# Patient Record
Sex: MALE | Race: WHITE | Employment: OTHER | URBAN - METROPOLITAN AREA
[De-identification: names, ages, dates, MRNs, and addresses within clinical notes are randomized per-mention and may not be internally consistent; named-entity substitution may affect disease eponyms.]

---

## 2018-07-16 ENCOUNTER — HOSPITAL ENCOUNTER (OUTPATIENT)
Age: 68
Discharge: HOME | End: 2018-07-18
Payer: COMMERCIAL

## 2018-07-16 LAB
ALBUMIN SERPL-MCNC: 4.5 G/DL (ref 3.5–5.2)
ALP BLD-CCNC: 44 U/L (ref 40–129)
ALT SERPL-CCNC: 14 U/L (ref 0–40)
ANION GAP SERPL CALCULATED.3IONS-SCNC: 15 MMOL/L (ref 7–16)
AST SERPL-CCNC: 19 U/L (ref 0–39)
BASOPHILS ABSOLUTE: 0.07 E9/L (ref 0–0.2)
BASOPHILS RELATIVE PERCENT: 1.2 % (ref 0–2)
BILIRUB SERPL-MCNC: 0.5 MG/DL (ref 0–1.2)
BUN BLDV-MCNC: 38 MG/DL (ref 8–23)
CALCIUM SERPL-MCNC: 9.9 MG/DL (ref 8.6–10.2)
CHLORIDE BLD-SCNC: 100 MMOL/L (ref 98–107)
CHOLESTEROL, TOTAL: 215 MG/DL (ref 0–199)
CO2: 25 MMOL/L (ref 22–29)
CREAT SERPL-MCNC: 2.5 MG/DL (ref 0.7–1.2)
CREATININE URINE: 209 MG/DL (ref 40–278)
EOSINOPHILS ABSOLUTE: 0.19 E9/L (ref 0.05–0.5)
EOSINOPHILS RELATIVE PERCENT: 3.2 % (ref 0–6)
GFR AFRICAN AMERICAN: 31
GFR NON-AFRICAN AMERICAN: 26 ML/MIN/1.73
GLUCOSE BLD-MCNC: 133 MG/DL (ref 74–109)
HBA1C MFR BLD: 8.2 % (ref 4–5.6)
HCT VFR BLD CALC: 47.1 % (ref 37–54)
HDLC SERPL-MCNC: 32 MG/DL
HEMOGLOBIN: 14.9 G/DL (ref 12.5–16.5)
IMMATURE GRANULOCYTES #: 0.05 E9/L
IMMATURE GRANULOCYTES %: 0.8 % (ref 0–5)
LDL CHOLESTEROL CALCULATED: 115 MG/DL (ref 0–99)
LYMPHOCYTES ABSOLUTE: 1.29 E9/L (ref 1.5–4)
LYMPHOCYTES RELATIVE PERCENT: 21.5 % (ref 20–42)
MCH RBC QN AUTO: 29.3 PG (ref 26–35)
MCHC RBC AUTO-ENTMCNC: 31.6 % (ref 32–34.5)
MCV RBC AUTO: 92.7 FL (ref 80–99.9)
MICROALBUMIN UR-MCNC: 836.7 MG/L
MICROALBUMIN/CREAT UR-RTO: 400.3 (ref 0–30)
MONOCYTES ABSOLUTE: 0.38 E9/L (ref 0.1–0.95)
MONOCYTES RELATIVE PERCENT: 6.3 % (ref 2–12)
NEUTROPHILS ABSOLUTE: 4.03 E9/L (ref 1.8–7.3)
NEUTROPHILS RELATIVE PERCENT: 67 % (ref 43–80)
PARATHYROID HORMONE INTACT: 50 PG/ML (ref 15–65)
PDW BLD-RTO: 14.4 FL (ref 11.5–15)
PHOSPHORUS: 3 MG/DL (ref 2.5–4.5)
PLATELET # BLD: 259 E9/L (ref 130–450)
PMV BLD AUTO: 10.9 FL (ref 7–12)
POTASSIUM SERPL-SCNC: 5.2 MMOL/L (ref 3.5–5)
RBC # BLD: 5.08 E12/L (ref 3.8–5.8)
SODIUM BLD-SCNC: 140 MMOL/L (ref 132–146)
TOTAL PROTEIN: 7.3 G/DL (ref 6.4–8.3)
TRIGL SERPL-MCNC: 341 MG/DL (ref 0–149)
VITAMIN D 25-HYDROXY: 26 NG/ML (ref 30–100)
VLDLC SERPL CALC-MCNC: 68 MG/DL
WBC # BLD: 6 E9/L (ref 4.5–11.5)

## 2018-07-16 PROCEDURE — 85025 COMPLETE CBC W/AUTO DIFF WBC: CPT

## 2018-07-16 PROCEDURE — 84100 ASSAY OF PHOSPHORUS: CPT

## 2018-07-16 PROCEDURE — 83970 ASSAY OF PARATHORMONE: CPT

## 2018-07-16 PROCEDURE — 83036 HEMOGLOBIN GLYCOSYLATED A1C: CPT

## 2018-07-16 PROCEDURE — 82044 UR ALBUMIN SEMIQUANTITATIVE: CPT

## 2018-07-16 PROCEDURE — 80061 LIPID PANEL: CPT

## 2018-07-16 PROCEDURE — 80053 COMPREHEN METABOLIC PANEL: CPT

## 2018-07-16 PROCEDURE — 82306 VITAMIN D 25 HYDROXY: CPT

## 2018-07-16 PROCEDURE — 84156 ASSAY OF PROTEIN URINE: CPT

## 2018-07-16 PROCEDURE — 82570 ASSAY OF URINE CREATININE: CPT

## 2018-07-17 LAB — PROTEIN PROTEIN: 119 MG/DL (ref 0–12)

## 2019-03-18 ENCOUNTER — HOSPITAL ENCOUNTER (OUTPATIENT)
Age: 69
Discharge: HOME | End: 2019-03-20
Payer: COMMERCIAL

## 2019-03-18 LAB
ALBUMIN SERPL-MCNC: 4.6 G/DL (ref 3.5–5.2)
ALP BLD-CCNC: 51 U/L (ref 40–129)
ALT SERPL-CCNC: 13 U/L (ref 0–40)
ANION GAP SERPL CALCULATED.3IONS-SCNC: 16 MMOL/L (ref 7–16)
AST SERPL-CCNC: 19 U/L (ref 0–39)
BACTERIA: ABNORMAL /HPF
BILIRUB SERPL-MCNC: 0.2 MG/DL (ref 0–1.2)
BILIRUBIN URINE: NEGATIVE
BLOOD, URINE: NEGATIVE
BUN BLDV-MCNC: 40 MG/DL (ref 8–23)
CALCIUM SERPL-MCNC: 9.7 MG/DL (ref 8.6–10.2)
CHLORIDE BLD-SCNC: 103 MMOL/L (ref 98–107)
CLARITY: CLEAR
CO2: 23 MMOL/L (ref 22–29)
COLOR: YELLOW
CREAT SERPL-MCNC: 2.6 MG/DL (ref 0.7–1.2)
CREATININE URINE: 205 MG/DL (ref 40–278)
CRYSTALS, UA: ABNORMAL
GFR AFRICAN AMERICAN: 30
GFR NON-AFRICAN AMERICAN: 25 ML/MIN/1.73
GLUCOSE BLD-MCNC: 157 MG/DL (ref 74–99)
GLUCOSE URINE: >=1000 MG/DL
KETONES, URINE: NEGATIVE MG/DL
LEUKOCYTE ESTERASE, URINE: NEGATIVE
NITRITE, URINE: NEGATIVE
PARATHYROID HORMONE INTACT: 42 PG/ML (ref 15–65)
PH UA: 5 (ref 5–9)
PHOSPHORUS: 3.4 MG/DL (ref 2.5–4.5)
POTASSIUM SERPL-SCNC: 4.5 MMOL/L (ref 3.5–5)
PROTEIN PROTEIN: 67 MG/DL (ref 0–12)
PROTEIN UA: 30 MG/DL
PROTEIN/CREAT RATIO: 0.3
PROTEIN/CREAT RATIO: 0.3 (ref 0–0.2)
RBC UA: ABNORMAL /HPF (ref 0–2)
SODIUM BLD-SCNC: 142 MMOL/L (ref 132–146)
SPECIFIC GRAVITY UA: 1.02 (ref 1–1.03)
TOTAL PROTEIN: 7.4 G/DL (ref 6.4–8.3)
UROBILINOGEN, URINE: 0.2 E.U./DL
WBC UA: ABNORMAL /HPF (ref 0–5)

## 2019-03-18 PROCEDURE — 83970 ASSAY OF PARATHORMONE: CPT

## 2019-03-18 PROCEDURE — 81001 URINALYSIS AUTO W/SCOPE: CPT

## 2019-03-18 PROCEDURE — 80053 COMPREHEN METABOLIC PANEL: CPT

## 2019-03-18 PROCEDURE — 84100 ASSAY OF PHOSPHORUS: CPT

## 2019-03-18 PROCEDURE — 82570 ASSAY OF URINE CREATININE: CPT

## 2019-03-18 PROCEDURE — 84156 ASSAY OF PROTEIN URINE: CPT

## 2019-07-07 ENCOUNTER — APPOINTMENT (OUTPATIENT)
Dept: CT IMAGING | Age: 69
DRG: 042 | End: 2019-07-07
Payer: COMMERCIAL

## 2019-07-07 ENCOUNTER — HOSPITAL ENCOUNTER (INPATIENT)
Age: 69
LOS: 4 days | Discharge: HOME | DRG: 042 | End: 2019-07-11
Attending: EMERGENCY MEDICINE | Admitting: INTERNAL MEDICINE
Payer: COMMERCIAL

## 2019-07-07 ENCOUNTER — APPOINTMENT (OUTPATIENT)
Dept: GENERAL RADIOLOGY | Age: 69
DRG: 042 | End: 2019-07-07
Payer: COMMERCIAL

## 2019-07-07 DIAGNOSIS — I63.9 CEREBROVASCULAR ACCIDENT (CVA), UNSPECIFIED MECHANISM (HCC): Primary | ICD-10-CM

## 2019-07-07 LAB
ALBUMIN SERPL-MCNC: 4.5 G/DL (ref 3.5–5.2)
ALP BLD-CCNC: 54 U/L (ref 40–129)
ALT SERPL-CCNC: 11 U/L (ref 0–40)
ANION GAP SERPL CALCULATED.3IONS-SCNC: 14 MMOL/L (ref 7–16)
APTT: 27.2 SEC (ref 24.5–35.1)
APTT: 27.7 SEC (ref 24.5–35.1)
AST SERPL-CCNC: 12 U/L (ref 0–39)
BASOPHILS ABSOLUTE: 0.07 E9/L (ref 0–0.2)
BASOPHILS RELATIVE PERCENT: 1 % (ref 0–2)
BILIRUB SERPL-MCNC: 0.4 MG/DL (ref 0–1.2)
BUN BLDV-MCNC: 39 MG/DL (ref 8–23)
CALCIUM SERPL-MCNC: 9.8 MG/DL (ref 8.6–10.2)
CHLORIDE BLD-SCNC: 100 MMOL/L (ref 98–107)
CO2: 26 MMOL/L (ref 22–29)
CREAT SERPL-MCNC: 2.5 MG/DL (ref 0.7–1.2)
EOSINOPHILS ABSOLUTE: 0.12 E9/L (ref 0.05–0.5)
EOSINOPHILS RELATIVE PERCENT: 1.8 % (ref 0–6)
GFR AFRICAN AMERICAN: 31
GFR NON-AFRICAN AMERICAN: 26 ML/MIN/1.73
GLUCOSE BLD-MCNC: 327 MG/DL (ref 74–99)
HBA1C MFR BLD: 8.5 % (ref 4–5.6)
HCT VFR BLD CALC: 46.2 % (ref 37–54)
HEMOGLOBIN: 15.1 G/DL (ref 12.5–16.5)
IMMATURE GRANULOCYTES #: 0.09 E9/L
IMMATURE GRANULOCYTES %: 1.3 % (ref 0–5)
INR BLD: 0.9
INR BLD: 0.9
LYMPHOCYTES ABSOLUTE: 1.11 E9/L (ref 1.5–4)
LYMPHOCYTES RELATIVE PERCENT: 16.2 % (ref 20–42)
MCH RBC QN AUTO: 29.4 PG (ref 26–35)
MCHC RBC AUTO-ENTMCNC: 32.7 % (ref 32–34.5)
MCV RBC AUTO: 90.1 FL (ref 80–99.9)
METER GLUCOSE: 164 MG/DL (ref 74–99)
METER GLUCOSE: 185 MG/DL (ref 74–99)
MONOCYTES ABSOLUTE: 0.39 E9/L (ref 0.1–0.95)
MONOCYTES RELATIVE PERCENT: 5.7 % (ref 2–12)
NEUTROPHILS ABSOLUTE: 5.07 E9/L (ref 1.8–7.3)
NEUTROPHILS RELATIVE PERCENT: 74 % (ref 43–80)
PDW BLD-RTO: 14.3 FL (ref 11.5–15)
PLATELET # BLD: 231 E9/L (ref 130–450)
PMV BLD AUTO: 10.6 FL (ref 7–12)
POTASSIUM SERPL-SCNC: 4.1 MMOL/L (ref 3.5–5)
PROTHROMBIN TIME: 10.3 SEC (ref 9.3–12.4)
PROTHROMBIN TIME: 10.4 SEC (ref 9.3–12.4)
RBC # BLD: 5.13 E12/L (ref 3.8–5.8)
SODIUM BLD-SCNC: 140 MMOL/L (ref 132–146)
TOTAL PROTEIN: 7.7 G/DL (ref 6.4–8.3)
TROPONIN: <0.01 NG/ML (ref 0–0.03)
WBC # BLD: 6.9 E9/L (ref 4.5–11.5)

## 2019-07-07 PROCEDURE — 3E05317 INTRODUCTION OF OTHER THROMBOLYTIC INTO PERIPHERAL ARTERY, PERCUTANEOUS APPROACH: ICD-10-PCS | Performed by: EMERGENCY MEDICINE

## 2019-07-07 PROCEDURE — 71045 X-RAY EXAM CHEST 1 VIEW: CPT

## 2019-07-07 PROCEDURE — 94761 N-INVAS EAR/PLS OXIMETRY MLT: CPT

## 2019-07-07 PROCEDURE — 83036 HEMOGLOBIN GLYCOSYLATED A1C: CPT

## 2019-07-07 PROCEDURE — 85025 COMPLETE CBC W/AUTO DIFF WBC: CPT

## 2019-07-07 PROCEDURE — 6370000000 HC RX 637 (ALT 250 FOR IP): Performed by: INTERNAL MEDICINE

## 2019-07-07 PROCEDURE — 36415 COLL VENOUS BLD VENIPUNCTURE: CPT

## 2019-07-07 PROCEDURE — 0042T CT BRAIN PERFUSION: CPT

## 2019-07-07 PROCEDURE — 85730 THROMBOPLASTIN TIME PARTIAL: CPT

## 2019-07-07 PROCEDURE — 99221 1ST HOSP IP/OBS SF/LOW 40: CPT | Performed by: PSYCHIATRY & NEUROLOGY

## 2019-07-07 PROCEDURE — 2500000003 HC RX 250 WO HCPCS: Performed by: EMERGENCY MEDICINE

## 2019-07-07 PROCEDURE — 70450 CT HEAD/BRAIN W/O DYE: CPT

## 2019-07-07 PROCEDURE — 2580000003 HC RX 258: Performed by: INTERNAL MEDICINE

## 2019-07-07 PROCEDURE — 2580000003 HC RX 258: Performed by: EMERGENCY MEDICINE

## 2019-07-07 PROCEDURE — 99285 EMERGENCY DEPT VISIT HI MDM: CPT

## 2019-07-07 PROCEDURE — 70498 CT ANGIOGRAPHY NECK: CPT

## 2019-07-07 PROCEDURE — 6360000004 HC RX CONTRAST MEDICATION: Performed by: RADIOLOGY

## 2019-07-07 PROCEDURE — 87081 CULTURE SCREEN ONLY: CPT

## 2019-07-07 PROCEDURE — 6360000002 HC RX W HCPCS: Performed by: EMERGENCY MEDICINE

## 2019-07-07 PROCEDURE — 80053 COMPREHEN METABOLIC PANEL: CPT

## 2019-07-07 PROCEDURE — 2000000000 HC ICU R&B

## 2019-07-07 PROCEDURE — 84484 ASSAY OF TROPONIN QUANT: CPT

## 2019-07-07 PROCEDURE — 82962 GLUCOSE BLOOD TEST: CPT

## 2019-07-07 PROCEDURE — 93005 ELECTROCARDIOGRAM TRACING: CPT | Performed by: EMERGENCY MEDICINE

## 2019-07-07 PROCEDURE — 96365 THER/PROPH/DIAG IV INF INIT: CPT

## 2019-07-07 PROCEDURE — 6360000002 HC RX W HCPCS: Performed by: PSYCHIATRY & NEUROLOGY

## 2019-07-07 PROCEDURE — 85610 PROTHROMBIN TIME: CPT

## 2019-07-07 PROCEDURE — 70496 CT ANGIOGRAPHY HEAD: CPT

## 2019-07-07 RX ORDER — SODIUM CHLORIDE 0.9 % (FLUSH) 0.9 %
10 SYRINGE (ML) INJECTION PRN
Status: DISCONTINUED | OUTPATIENT
Start: 2019-07-07 | End: 2019-07-11 | Stop reason: HOSPADM

## 2019-07-07 RX ORDER — LABETALOL HYDROCHLORIDE 5 MG/ML
10 INJECTION, SOLUTION INTRAVENOUS EVERY 10 MIN PRN
Status: DISCONTINUED | OUTPATIENT
Start: 2019-07-07 | End: 2019-07-11 | Stop reason: HOSPADM

## 2019-07-07 RX ORDER — SODIUM CHLORIDE 0.9 % (FLUSH) 0.9 %
10 SYRINGE (ML) INJECTION EVERY 12 HOURS SCHEDULED
Status: DISCONTINUED | OUTPATIENT
Start: 2019-07-07 | End: 2019-07-11 | Stop reason: HOSPADM

## 2019-07-07 RX ORDER — HYDRALAZINE HYDROCHLORIDE 20 MG/ML
10 INJECTION INTRAMUSCULAR; INTRAVENOUS EVERY 4 HOURS PRN
Status: DISCONTINUED | OUTPATIENT
Start: 2019-07-07 | End: 2019-07-07

## 2019-07-07 RX ORDER — DILTIAZEM HYDROCHLORIDE 240 MG/1
240 CAPSULE, COATED, EXTENDED RELEASE ORAL EVERY MORNING
Status: DISCONTINUED | OUTPATIENT
Start: 2019-07-08 | End: 2019-07-11 | Stop reason: HOSPADM

## 2019-07-07 RX ORDER — DEXTROSE MONOHYDRATE 50 MG/ML
100 INJECTION, SOLUTION INTRAVENOUS PRN
Status: DISCONTINUED | OUTPATIENT
Start: 2019-07-07 | End: 2019-07-08

## 2019-07-07 RX ORDER — NICOTINE POLACRILEX 4 MG
15 LOZENGE BUCCAL PRN
Status: DISCONTINUED | OUTPATIENT
Start: 2019-07-07 | End: 2019-07-11 | Stop reason: HOSPADM

## 2019-07-07 RX ORDER — 0.9 % SODIUM CHLORIDE 0.9 %
50 INTRAVENOUS SOLUTION INTRAVENOUS ONCE
Status: COMPLETED | OUTPATIENT
Start: 2019-07-07 | End: 2019-07-07

## 2019-07-07 RX ORDER — ATENOLOL 50 MG/1
50 TABLET ORAL DAILY
Status: DISCONTINUED | OUTPATIENT
Start: 2019-07-07 | End: 2019-07-11 | Stop reason: HOSPADM

## 2019-07-07 RX ORDER — DEXTROSE MONOHYDRATE 25 G/50ML
12.5 INJECTION, SOLUTION INTRAVENOUS
Status: ACTIVE | OUTPATIENT
Start: 2019-07-07 | End: 2019-07-07

## 2019-07-07 RX ORDER — ALLOPURINOL 300 MG/1
150 TABLET ORAL DAILY
Status: DISCONTINUED | OUTPATIENT
Start: 2019-07-08 | End: 2019-07-11 | Stop reason: HOSPADM

## 2019-07-07 RX ORDER — HYDRALAZINE HYDROCHLORIDE 20 MG/ML
10 INJECTION INTRAMUSCULAR; INTRAVENOUS EVERY 10 MIN PRN
Status: DISCONTINUED | OUTPATIENT
Start: 2019-07-07 | End: 2019-07-11 | Stop reason: HOSPADM

## 2019-07-07 RX ORDER — ATORVASTATIN CALCIUM 40 MG/1
80 TABLET, FILM COATED ORAL DAILY
Status: DISCONTINUED | OUTPATIENT
Start: 2019-07-08 | End: 2019-07-11 | Stop reason: HOSPADM

## 2019-07-07 RX ORDER — 0.9 % SODIUM CHLORIDE 0.9 %
1000 INTRAVENOUS SOLUTION INTRAVENOUS ONCE
Status: COMPLETED | OUTPATIENT
Start: 2019-07-07 | End: 2019-07-07

## 2019-07-07 RX ORDER — LABETALOL HYDROCHLORIDE 5 MG/ML
20 INJECTION, SOLUTION INTRAVENOUS ONCE
Status: COMPLETED | OUTPATIENT
Start: 2019-07-07 | End: 2019-07-07

## 2019-07-07 RX ORDER — LABETALOL HYDROCHLORIDE 5 MG/ML
10 INJECTION, SOLUTION INTRAVENOUS EVERY 4 HOURS PRN
Status: DISCONTINUED | OUTPATIENT
Start: 2019-07-07 | End: 2019-07-07

## 2019-07-07 RX ORDER — FERROUS SULFATE 325(65) MG
325 TABLET ORAL DAILY
Status: DISCONTINUED | OUTPATIENT
Start: 2019-07-08 | End: 2019-07-11 | Stop reason: HOSPADM

## 2019-07-07 RX ORDER — DEXTROSE MONOHYDRATE 25 G/50ML
12.5 INJECTION, SOLUTION INTRAVENOUS PRN
Status: DISCONTINUED | OUTPATIENT
Start: 2019-07-07 | End: 2019-07-11 | Stop reason: HOSPADM

## 2019-07-07 RX ORDER — ONDANSETRON 2 MG/ML
4 INJECTION INTRAMUSCULAR; INTRAVENOUS EVERY 6 HOURS PRN
Status: DISCONTINUED | OUTPATIENT
Start: 2019-07-07 | End: 2019-07-11 | Stop reason: HOSPADM

## 2019-07-07 RX ORDER — FENOFIBRATE 160 MG/1
160 TABLET ORAL DAILY
Status: DISCONTINUED | OUTPATIENT
Start: 2019-07-07 | End: 2019-07-11 | Stop reason: HOSPADM

## 2019-07-07 RX ADMIN — IOPAMIDOL 100 ML: 755 INJECTION, SOLUTION INTRAVENOUS at 15:36

## 2019-07-07 RX ADMIN — ATENOLOL 50 MG: 50 TABLET ORAL at 17:54

## 2019-07-07 RX ADMIN — INSULIN LISPRO 1 UNITS: 100 INJECTION, SOLUTION INTRAVENOUS; SUBCUTANEOUS at 17:54

## 2019-07-07 RX ADMIN — ALTEPLASE 9 MG: KIT at 14:01

## 2019-07-07 RX ADMIN — SODIUM CHLORIDE 50 ML: 9 INJECTION, SOLUTION INTRAVENOUS at 15:01

## 2019-07-07 RX ADMIN — Medication 10 ML: at 21:13

## 2019-07-07 RX ADMIN — Medication 10 ML: at 20:41

## 2019-07-07 RX ADMIN — SODIUM CHLORIDE 1000 ML: 9 INJECTION, SOLUTION INTRAVENOUS at 15:39

## 2019-07-07 RX ADMIN — HYDRALAZINE HYDROCHLORIDE 10 MG: 20 INJECTION INTRAMUSCULAR; INTRAVENOUS at 18:13

## 2019-07-07 RX ADMIN — INSULIN LISPRO 1 UNITS: 100 INJECTION, SOLUTION INTRAVENOUS; SUBCUTANEOUS at 20:44

## 2019-07-07 RX ADMIN — ALTEPLASE 81 MG: KIT at 14:01

## 2019-07-07 RX ADMIN — LABETALOL HYDROCHLORIDE 20 MG: 5 INJECTION INTRAVENOUS at 15:32

## 2019-07-07 RX ADMIN — Medication 10 ML: at 18:14

## 2019-07-07 ASSESSMENT — PAIN SCALES - GENERAL
PAINLEVEL_OUTOF10: 0
PAINLEVEL_OUTOF10: 0

## 2019-07-07 NOTE — CONSULTS
Motor Drift 0 - no drift; leg holds 30 degree position for full 5 seconds   6B: Test Right Leg Motor Drift 0 - no drift; leg holds 30 degree position for full 5 seconds   7: Test Limb Ataxia   (FNF/Heel-Shin) 2 - present in two limbs   8: Test Sensation 1 - mild to moderate sensory loss; patient feels pinprick is less sharp or is dull on the affected side; there is a loss of superficial pain with pinprick but patient is aware of being touched    9: Test Language/Aphasia 0 - no aphasia, normal   10: Test Dysarthria 0 - normal   11: Test Extinction/Inattention 0 - no abnormality   Total 4         Laboratory/Radiology:     CBC with Differential:    Lab Results   Component Value Date    WBC 6.9 07/07/2019    RBC 5.13 07/07/2019    HGB 15.1 07/07/2019    HCT 46.2 07/07/2019     07/07/2019    MCV 90.1 07/07/2019    MCH 29.4 07/07/2019    MCHC 32.7 07/07/2019    RDW 14.3 07/07/2019    SEGSPCT 70 02/25/2014    LYMPHOPCT 16.2 07/07/2019    MONOPCT 5.7 07/07/2019    BASOPCT 1.0 07/07/2019    MONOSABS 0.39 07/07/2019    LYMPHSABS 1.11 07/07/2019    EOSABS 0.12 07/07/2019    BASOSABS 0.07 07/07/2019     CMP:    Lab Results   Component Value Date     07/07/2019    K 4.1 07/07/2019     07/07/2019    CO2 26 07/07/2019    BUN 39 07/07/2019    CREATININE 2.5 07/07/2019    GFRAA 31 07/07/2019    LABGLOM 26 07/07/2019    GLUCOSE 327 07/07/2019    GLUCOSE 102 05/02/2012    PROT 7.7 07/07/2019    LABALBU 4.5 07/07/2019    LABALBU 4.5 05/02/2012    CALCIUM 9.8 07/07/2019    BILITOT 0.4 07/07/2019    ALKPHOS 54 07/07/2019    AST 12 07/07/2019    ALT 11 07/07/2019     Troponin:    Lab Results   Component Value Date    TROPONINI <0.01 07/07/2019     HgBA1c:    Lab Results   Component Value Date    LABA1C 8.2 07/16/2018     FLP:    Lab Results   Component Value Date    TRIG 341 07/16/2018    HDL 32 07/16/2018    LDLCALC 115 07/16/2018    LABVLDL 68 07/16/2018     Neuroimaging as above.    I independently reviewed the labs and imaging studies at today's appointment. Assessment:     78year old man with multiple vascular risk factors presents with embolic appearing stroke to distal posterior branch of LMCA. No significant atherosclerosis in carotids or intracranially. Suspect cardioembolic vs aortic atheroma. Patient Active Problem List   Diagnosis    S/P AAA (abdominal aortic aneurysm) repair    Hypertension    Hyperlipidemia    Diabetes mellitus ([facility] [address]    Renal insufficiency    Stroke determined by clinical assessment ([facility] [address]       Plan:     Post TPA protocol. Blood pressure less than 185/105  MRI tomorrow. Echocardiogram.   Continue high intensity statin. AM a1c and lipids. Discussed smoking cessation. He plans to quit. He is unsure if he wants nicotine patch. Gently hydration given contrast load and CKD.       Renita Murphy  5:57 PM  7/7/2019

## 2019-07-07 NOTE — CONSULTS
mellitus ([facility] [address]     Hyperlipidemia     Hypertension     Renal insufficiency     dr Petr Kendrick     Past Surgical History:   Procedure Laterality Date    ABDOMINAL AORTIC ANEURYSM REPAIR  3-    BUNIONECTOMY  2012   [facility] JOINT REPLACEMENT  2013    right knee    TONSILLECTOMY       Social History     Socioeconomic History    Marital status:      Spouse name: Not on file    Number of children: Not on file    Years of education: Not on file    Highest education level: Not on file   Occupational History    Not on file   Social Needs    Financial resource strain: Not on file    Food insecurity:     Worry: Not on file     Inability: Not on file    Transportation needs:     Medical: Not on file     Non-medical: Not on file   Tobacco Use    Smoking status: Current Every Day Smoker     Packs/day: 0.25     Years: 40.00     Pack years: 10.00    Smokeless tobacco: Never Used   Substance and Sexual Activity    Alcohol use:  Yes     Alcohol/week: 0.6 oz     Types: 1 Cans of beer per week     Comment: weekly- 4    Drug use: No    Sexual activity: Not on file   Lifestyle    Physical activity:     Days per week: Not on file     Minutes per session: Not on file    Stress: Not on file   Relationships    Social connections:     Talks on phone: Not on file     Gets together: Not on file     Attends Jewish service: Not on file     Active member of club or organization: Not on file     Attends meetings of clubs or organizations: Not on file     Relationship status: Not on file    Intimate partner violence:     Fear of current or ex partner: Not on file     Emotionally abused: Not on file     Physically abused: Not on file     Forced sexual activity: Not on file   Other Topics Concern    Not on file   Social History Narrative    Not on file       OBJECTIVE  Vitals:    07/07/19 1341 07/07/19 1342 07/07/19 1343 07/07/19 1343   BP:    (!) 155/111   Pulse: 106  101 103   Resp: 12  20 20   SpO2: 98%  98% 100%

## 2019-07-07 NOTE — CONSULTS
treatment    Consultation completed by Rashaad Kraus MD via telephone.       Electronically signed by Rashaad Kraus MD on 7/7/2019 at 2:25 PM

## 2019-07-08 ENCOUNTER — APPOINTMENT (OUTPATIENT)
Dept: MRI IMAGING | Age: 69
DRG: 042 | End: 2019-07-08
Payer: COMMERCIAL

## 2019-07-08 LAB
ANION GAP SERPL CALCULATED.3IONS-SCNC: 13 MMOL/L (ref 7–16)
BASOPHILS ABSOLUTE: 0.07 E9/L (ref 0–0.2)
BASOPHILS RELATIVE PERCENT: 1 % (ref 0–2)
BUN BLDV-MCNC: 34 MG/DL (ref 8–23)
CALCIUM SERPL-MCNC: 9 MG/DL (ref 8.6–10.2)
CHLORIDE BLD-SCNC: 107 MMOL/L (ref 98–107)
CHOLESTEROL, TOTAL: 233 MG/DL (ref 0–199)
CO2: 22 MMOL/L (ref 22–29)
CREAT SERPL-MCNC: 2.1 MG/DL (ref 0.7–1.2)
EKG ATRIAL RATE: 92 BPM
EKG P AXIS: 68 DEGREES
EKG P-R INTERVAL: 212 MS
EKG Q-T INTERVAL: 390 MS
EKG QRS DURATION: 154 MS
EKG QTC CALCULATION (BAZETT): 482 MS
EKG R AXIS: -94 DEGREES
EKG T AXIS: 50 DEGREES
EKG VENTRICULAR RATE: 92 BPM
EOSINOPHILS ABSOLUTE: 0.23 E9/L (ref 0.05–0.5)
EOSINOPHILS RELATIVE PERCENT: 3.2 % (ref 0–6)
GFR AFRICAN AMERICAN: 38
GFR NON-AFRICAN AMERICAN: 31 ML/MIN/1.73
GLUCOSE BLD-MCNC: 101 MG/DL (ref 74–99)
HCT VFR BLD CALC: 42 % (ref 37–54)
HDLC SERPL-MCNC: 27 MG/DL
HEMOGLOBIN: 13.8 G/DL (ref 12.5–16.5)
IMMATURE GRANULOCYTES #: 0.07 E9/L
IMMATURE GRANULOCYTES %: 1 % (ref 0–5)
LDL CHOLESTEROL CALCULATED: 130 MG/DL (ref 0–99)
LYMPHOCYTES ABSOLUTE: 1.34 E9/L (ref 1.5–4)
LYMPHOCYTES RELATIVE PERCENT: 18.5 % (ref 20–42)
MCH RBC QN AUTO: 29.7 PG (ref 26–35)
MCHC RBC AUTO-ENTMCNC: 32.9 % (ref 32–34.5)
MCV RBC AUTO: 90.3 FL (ref 80–99.9)
METER GLUCOSE: 135 MG/DL (ref 74–99)
METER GLUCOSE: 136 MG/DL (ref 74–99)
METER GLUCOSE: 137 MG/DL (ref 74–99)
METER GLUCOSE: 275 MG/DL (ref 74–99)
MONOCYTES ABSOLUTE: 0.5 E9/L (ref 0.1–0.95)
MONOCYTES RELATIVE PERCENT: 6.9 % (ref 2–12)
NEUTROPHILS ABSOLUTE: 5.05 E9/L (ref 1.8–7.3)
NEUTROPHILS RELATIVE PERCENT: 69.4 % (ref 43–80)
PDW BLD-RTO: 14.6 FL (ref 11.5–15)
PLATELET # BLD: 204 E9/L (ref 130–450)
PMV BLD AUTO: 10.2 FL (ref 7–12)
POTASSIUM REFLEX MAGNESIUM: 3.8 MMOL/L (ref 3.5–5)
RBC # BLD: 4.65 E12/L (ref 3.8–5.8)
SODIUM BLD-SCNC: 142 MMOL/L (ref 132–146)
TRIGL SERPL-MCNC: 382 MG/DL (ref 0–149)
VLDLC SERPL CALC-MCNC: 76 MG/DL
WBC # BLD: 7.3 E9/L (ref 4.5–11.5)

## 2019-07-08 PROCEDURE — 36415 COLL VENOUS BLD VENIPUNCTURE: CPT

## 2019-07-08 PROCEDURE — 2060000000 HC ICU INTERMEDIATE R&B

## 2019-07-08 PROCEDURE — 82962 GLUCOSE BLOOD TEST: CPT

## 2019-07-08 PROCEDURE — 97166 OT EVAL MOD COMPLEX 45 MIN: CPT

## 2019-07-08 PROCEDURE — 70551 MRI BRAIN STEM W/O DYE: CPT

## 2019-07-08 PROCEDURE — 97162 PT EVAL MOD COMPLEX 30 MIN: CPT

## 2019-07-08 PROCEDURE — 93010 ELECTROCARDIOGRAM REPORT: CPT | Performed by: INTERNAL MEDICINE

## 2019-07-08 PROCEDURE — 97530 THERAPEUTIC ACTIVITIES: CPT

## 2019-07-08 PROCEDURE — 99231 SBSQ HOSP IP/OBS SF/LOW 25: CPT | Performed by: NURSE PRACTITIONER

## 2019-07-08 PROCEDURE — 80048 BASIC METABOLIC PNL TOTAL CA: CPT

## 2019-07-08 PROCEDURE — 99233 SBSQ HOSP IP/OBS HIGH 50: CPT | Performed by: NURSE PRACTITIONER

## 2019-07-08 PROCEDURE — 80061 LIPID PANEL: CPT

## 2019-07-08 PROCEDURE — 6360000002 HC RX W HCPCS: Performed by: INTERNAL MEDICINE

## 2019-07-08 PROCEDURE — 2580000003 HC RX 258: Performed by: INTERNAL MEDICINE

## 2019-07-08 PROCEDURE — 85025 COMPLETE CBC W/AUTO DIFF WBC: CPT

## 2019-07-08 PROCEDURE — 6370000000 HC RX 637 (ALT 250 FOR IP): Performed by: INTERNAL MEDICINE

## 2019-07-08 PROCEDURE — 92523 SPEECH SOUND LANG COMPREHEN: CPT | Performed by: SPEECH-LANGUAGE PATHOLOGIST

## 2019-07-08 PROCEDURE — 97535 SELF CARE MNGMENT TRAINING: CPT

## 2019-07-08 RX ADMIN — DILTIAZEM HYDROCHLORIDE 240 MG: 240 CAPSULE, COATED, EXTENDED RELEASE ORAL at 09:20

## 2019-07-08 RX ADMIN — Medication 10 ML: at 09:22

## 2019-07-08 RX ADMIN — Medication 10 ML: at 21:34

## 2019-07-08 RX ADMIN — ALLOPURINOL 150 MG: 300 TABLET ORAL at 09:22

## 2019-07-08 RX ADMIN — INSULIN LISPRO 3 UNITS: 100 INJECTION, SOLUTION INTRAVENOUS; SUBCUTANEOUS at 12:10

## 2019-07-08 RX ADMIN — HYDRALAZINE HYDROCHLORIDE 10 MG: 20 INJECTION INTRAMUSCULAR; INTRAVENOUS at 22:42

## 2019-07-08 RX ADMIN — ATENOLOL 50 MG: 50 TABLET ORAL at 09:20

## 2019-07-08 RX ADMIN — FENOFIBRATE 160 MG: 160 TABLET ORAL at 09:21

## 2019-07-08 RX ADMIN — ATORVASTATIN CALCIUM 80 MG: 40 TABLET, FILM COATED ORAL at 09:20

## 2019-07-08 RX ADMIN — FERROUS SULFATE TAB 325 MG (65 MG ELEMENTAL FE) 325 MG: 325 (65 FE) TAB at 09:20

## 2019-07-08 ASSESSMENT — PAIN SCALES - GENERAL
PAINLEVEL_OUTOF10: 0

## 2019-07-08 NOTE — PROGRESS NOTES
standard toilet  Equipment owned: ww, cane, BSC, shower chair    Prior Level of Function: Indep with ADLs; Indep with IADLs including outdoor/lawn work; using no device for ambulation. Driving: yes  Occupation: Retired- enjoys playing "[employer] 3 days/week     Pain Level: 0/10   Cognition: A&O: 4/4; Follows 2 step directions. G Attention to task. Communication: G  Ability to express needs   Memory: G   Sequencing: F             Comprehension: G   Problem solving: F   Judgement/safety: F-   Max cues for RUE awareness during STS/transfers, ADLs and also when using stairs/rail use     RASS: 0  CAM-ICU: NT     Functional Assessment:   Initial Eval Status  Date: 7/8/19 Treatment Status  Date: Short Term Goals  Treatment frequency: 1-3x/week on ICU; PRN on stepdown unit  -pt will improve. .. Feeding S;setup  No issues using non-dominant L hand (simulation). P/P+ [address] and grasp/pinch strength of R hand for utensil use (max A). Intro to built-up foam.       Mod I  Sitting upright in chair for majority of meals using AE prn; pending cleared diet restriction   Grooming Min A  SBA for standing balance at sink no device     P+ use of R hand as gross stabilizer with VC/education while manipulating items with L hand. Unable to hold tooth brush R hand but able with L to brush teeth. Mod I   while seated/standing with AE prn; G RUE functional use as gross stabilizer and F [address] [place] skills     UB Dressing Min A  Changing gown with education on compensatory technique threading RUE first; seated arm chair.     Assist to tie, manipulate snaps d/t poor R hand FMC/pinch strength    Min A for standing balance during gown retrieval from lower drawer with F- R hand grasping ability when retrieving; good carryover of draping gown over forearm when transporting back to bedside     Mod I  while seated using compensatory technique; including clothing retrieval;    F RUE functional use and [address] [place] skills; G RUE awareness   LB Dressing Mod A  Assist to ROM  Comments   RUE  Proximal: 4/5 shoulder, elbow, forearm, wrist  Distal:   3-/5 digits flexion/extension  1/5 intrinsics and interossei   Thumb 2/5 Proximal: WFL   Distal: WFL P+   P+ FMC/dexterity noted during ADL tasks; gross stabilizer     Oppose thumb to digits 2-3 only. Awkward movements   LUE Proximal: 5/5  Distal: 5/5 Proximal:  -PROM: WFL   -AROM: WFL  Distal: WFL G   G FMC/dexterity noted during ADL tasks      Hearing: [facility]   Sensation: Pt reports numbness/ tingling in RUE from shoulder to digits. Decreased awareness noted. Tone: WFL  Edema: Unremarkable    Vitals:   BP at rest: 149/82 BP at end of session: 131/85   HR at rest: 84 bpm HR at end of session: 88 bpm   Spo2 at rest: 93% RA  Spo2 at end of session: 96% RA                             Comments/Treatment Narrative: Attended interdisciplinary rounds. OK from RN to see patient. Thorough chart review and evaluation completed with PT collaboration. Upon arrival patient supine with HOB slightly elevated-wife present. RN present and providing medications. Patient agreeable to session. Pt denies any changes in vision. Showing RUE distal weakness, decreased [address] [place] skills and decreased proprioceptive awareness of position in space during ADL tasks and transfers. Pt completed UB/LB dressing tasks and grooming tasks while standing at sink with continual VC for R hand awareness and use as gross stabilizer with P+ ability; transfers and functional mobility completed using no device as stated above. Completed clothing retrieval from lower drawers with P+ use of RUE and min A for dyn balance. No dizziness noted. Standing tolerance ~12 minutes. Returned to bedside arm chair with all devices within reach, all lines and tubes intact, nursing notified of pt status- recommend BSC use. Jodie Kendrick Pt required cues and education as noted above for safe facilitation and completion of tasks.  During functional activites and ADLs pt educated on RUE awareness and proper

## 2019-07-08 NOTE — PROGRESS NOTES
Gait:  Not tested     DTR:   1-2 throughout    No Babinskis  No Rizvi's    No pathological reflexes    Laboratory/Radiology:     CBC:   Lab Results   Component Value Date    WBC 7.3 07/08/2019    RBC 4.65 07/08/2019    HGB 13.8 07/08/2019    HCT 42.0 07/08/2019    MCV 90.3 07/08/2019    MCH 29.7 07/08/2019    MCHC 32.9 07/08/2019    RDW 14.6 07/08/2019     07/08/2019    MPV 10.2 07/08/2019     CMP:    Lab Results   Component Value Date     07/08/2019    K 3.8 07/08/2019     07/08/2019    CO2 22 07/08/2019    BUN 34 07/08/2019    CREATININE 2.1 07/08/2019    GFRAA 38 07/08/2019    LABGLOM 31 07/08/2019    GLUCOSE 101 07/08/2019    PROT 7.7 07/07/2019    LABALBU 4.5 07/07/2019    CALCIUM 9.0 07/08/2019    BILITOT 0.4 07/07/2019    ALKPHOS 54 07/07/2019    AST 12 07/07/2019    ALT 11 07/07/2019     Hepatic Function Panel:    Lab Results   Component Value Date    ALKPHOS 54 07/07/2019    ALT 11 07/07/2019    AST 12 07/07/2019    PROT 7.7 07/07/2019    BILITOT 0.4 07/07/2019    LABALBU 4.5 07/07/2019     HgBA1c:    Lab Results   Component Value Date    LABA1C 8.5 07/07/2019     FLP:    Lab Results   Component Value Date    TRIG 382 07/08/2019    HDL 27 07/08/2019    LDLCALC 130 07/08/2019    LABVLDL 76 07/08/2019     CT Head WO Contrast   Final Result      No evidence of acute intracranial hemorrhage or edema. CTA HEAD W CONTRAST   Final Result      1. No evidence of intracranial hemorrhage or edema. 2. No evidence of arterial stenosis at level of the [place]. 3. No evidence of stenosis involving proximal or distal cervical   internal carotid arteries or vertebral arteries. 4. Significant noncalcified atherosclerotic plaque is seen involving   thoracic aortic arch and at origin in of great vessels. CTA NECK W CONTRAST   Final Result      1. No evidence of intracranial hemorrhage or edema.       2. No evidence of arterial stenosis at level of the

## 2019-07-08 NOTE — PLAN OF CARE
Problem: Falls - Risk of:  Goal: Will remain free from falls  Description  Will remain free from falls  7/8/2019 0026 by Tracey Guerra RN  Outcome: Met This Shift  7/7/2019 1858 by Lucille Rangel RN  Outcome: Met This Shift  Goal: Absence of physical injury  Description  Absence of physical injury  7/8/2019 0026 by Tracey Guerra RN  Outcome: Met This Shift  7/7/2019 2114 by Tracey Guerra RN  Outcome: Met This Shift  7/7/2019 1858 by Lucille Rangel RN  Outcome: Met This Shift     Problem: HEMODYNAMIC STATUS  Goal: Patient has stable vital signs and fluid balance  7/8/2019 0026 by Tracey Guerra RN  Outcome: Met This Shift  7/7/2019 2114 by Tracey Guerra RN  Outcome: Met This Shift     Problem: ACTIVITY INTOLERANCE/IMPAIRED MOBILITY  Goal: Mobility/activity is maintained at optimum level for patient  7/8/2019 0026 by Tracey Guerra RN  Outcome: Met This Shift  7/7/2019 2114 by Tracey Guerra RN  Outcome: Met This Shift     Problem: COMMUNICATION IMPAIRMENT  Goal: Ability to express needs and understand communication  7/8/2019 0026 by Tracey Guerra RN  Outcome: Met This Shift  7/7/2019 2114 by Tracey Guerra RN  Outcome: Met This Shift

## 2019-07-08 NOTE — PROGRESS NOTES
[facility] Int[facility] Unit  Critical Care Consult 7/8/2019      Date of Admission: 7/7/19    CC: R sided weakness    HPI: 78year old male with a PMH of HTN, HLD, DM, AAA, CKD presented to the ED with c/o right sided weakness. He was given tPA and was admitted to NSICU for further care. Problem List:   Patient Active Problem List   Diagnosis    S/P AAA (abdominal aortic aneurysm) repair    Hypertension    Hyperlipidemia    Diabetes mellitus ([facility] [address]    Renal insufficiency    Stroke determined by clinical assessment [facility])       Surgical/Interventional Procedures:   7/7 tPA 80    PMH:    has a past medical history of AAA (abdominal aortic aneurysm) ([facility] [address] Diabetes mellitus ([facility] [address] Hyperlipidemia, Hypertension, and Renal insufficiency. PSH:    has a past surgical history that includes joint replacement (2013); Bunionectomy (2012); Tonsillectomy; and Abdominal aortic aneurysm repair (3-). Home Medications:   Prior to Admission medications    Medication Sig Start Date End Date Taking?  Authorizing Provider   glipiZIDE (GLUCOTROL) 10 MG tablet Take 1 tablet by mouth daily 3/24/16  Yes Amber Overton, DO   allopurinol (ZYLOPRIM) 300 MG tablet Take 0.5 tablets by mouth daily 3/24/16  Yes Amber Overton, DO   ferrous sulfate (NAVA-FEMI) 325 (65 FE) MG tablet Take 1 tablet by mouth daily 3/23/16  Yes Amber Overton,    diltiazem (CARDIZEM CD) 240 MG ER capsule Take 240 mg by mouth every morning    Yes Historical Provider, MD   atorvastatin (LIPITOR) 80 MG tablet Take 80 mg by mouth daily   Yes Historical Provider, MD   Cholecalciferol (VITAMIN D-3 PO) Take 50,000 Units by mouth daily    Yes Historical Provider, MD   Vitamins A & D (VITAMIN A & D PO) Take by mouth daily   Yes Historical Provider, MD   CALCIUM & MAGNESIUM CARBONATES PO Take by mouth three times a week   Yes Historical Provider, MD   Coenzyme Q10 (CO Q 10 PO) Take by mouth daily   Yes Historical Provider, MD   Omega-3 Fatty

## 2019-07-08 NOTE — H&P
Normocephalic, without obvious abnormality, atraumatic  Eyes: conjunctivae/corneas clear. PERRL, EOM's intact. Fundi benign. Ears: normal TM's and external ear canals both ears  Nose: Nares normal. Septum midline. Mucosa normal. No drainage or sinus tenderness. Throat: lips, mucosa, and tongue normal; teeth and gums normal  Neck: no adenopathy, no carotid bruit, no JVD, supple, symmetrical, trachea midline and thyroid not enlarged, symmetric, no tenderness/mass/nodules  Lungs: clear to auscultation bilaterally  Heart: regular rate and rhythm, S1, S2 normal, no murmur, click, rub or gallop  Abdomen: soft, non-tender; bowel sounds normal; no masses,  no organomegaly  Extremities: extremities normal, atraumatic, no cyanosis or edema  Pulses: 2+ and symmetric  Skin: Skin color, texture, turgor normal. No rashes or lesions  Neurologic: Speech is fluent and normal in content; gait not tested; 3/5 muscle strength right upper extremity with difficulty with fine motor coordination; no other extremity weakness noted    Last Refreshed: 19 1456 [Time Alexis Orders]   Results      Component Value Units   MRI Brain WO Contrast [[id number]] Resulted: 19 1415   Updated: 19 141    Narrative:     Patient MRN:  [id number]  : 1950  Age: 78 years  Gender: Male    Order Date:  2019 12:00 AM    EXAM: MRI BRAIN WO CONTRAST      INDICATION:  STROKE      COMPARISON: None    FINDINGS:    On diffusion-weighted imaging there is a tiny acute lacunar infarction  in the right occipital lobe. There is a small acute infarction in the  left frontoparietal convexity. There is normal flow voids of the intracranial circulation. There are  retention cysts in both maxillary sinuses more prominent on the left. There is mild chronic ischemic/degenerative changes in the white  matter. There is no mass effect, edema or hemorrhage. Impression:     Small acute infarcts.  Largest is in left frontoparietal convexity and  a smaller one is in the right occipital lobe. Multiplicity and  bilaterality raises the possibility of cardiac embolic source. Clinical correlation is recommended       POCT Glucose [[id number]] (Abnormal) Collected: 07/08/19 1208   Updated: 07/08/19 1214     Meter Glucose 275High  mg/dL   POCT Glucose [[id number]] (Abnormal) Collected: 07/08/19 0850   Updated: 07/08/19 0855     Meter Glucose 135High  mg/dL   EKG 12 Lead [[id number]] Collected: 07/07/19 1413   Updated: 07/08/19 0717     Ventricular Rate 92 BPM    Atrial Rate 92 BPM    P-R Interval 212 ms    QRS Duration 154 ms    Q-T Interval 390 ms    QTc Calculation (Bazett) 482 ms    P Axis 68 degrees    R Axis -94 degrees    T Axis 50 degrees   Narrative:     Sinus rhythm with 1st degree AV block  Possible Left atrial enlargement  Right bundle branch block    Confirmed by Theo Cortez ([id number]) on 7/8/2019 6:37:34 AM   Basic Metabolic Panel w/ Reflex to MG [[id number]] (Abnormal) Collected: 07/08/19 0500   Updated: 07/08/19 0552    Specimen Source: Blood     Sodium 142 mmol/L    Potassium reflex Magnesium 3.8 mmol/L    Chloride 107 mmol/L    CO2 22 mmol/L    Anion Gap 13 mmol/L    Glucose 101High  mg/dL    BUN 34High  mg/dL    CREATININE 2.1High  mg/dL    GFR Non-African American 31 mL/min/1.73    Comment: Chronic Kidney Disease: less than 60 ml/min/1.73 sq. m.         Kidney Failure: less than 15 ml/min/1.73 sq.m. Results valid for patients 18 years and older.         GFR African American 38    Calcium 9.0 mg/dL   Lipid panel [[id number]] (Abnormal) Collected: 07/08/19 0500   Updated: 07/08/19 0552    Specimen Source: Blood     Cholesterol, Total 233High  mg/dL    Triglycerides 382High  mg/dL    HDL 27 mg/dL    LDL Calculated 130High  mg/dL    VLDL Cholesterol Calculated 76 mg/dL   CBC auto differential [[id number]] (Abnormal) Collected: 07/08/19 0500   Updated: 07/08/19 0527    Specimen Source: Blood     WBC 7.3 E9/L    RBC 4.65 E12/L    Hemoglobin 13.8 g/dL    Hematocrit and left subclavian  arteries and thoracic aortic arch. Common carotid arteries are patent  without evidence of stenosis. Carotid bulbs are unremarkable. No  evidence of stenosis involving proximal or distal cervical internal  carotid arteries or vertebral arteries. 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 Impression:       1. No evidence of intracranial hemorrhage or edema. 2. No evidence of arterial stenosis at level of the [place]. 3. No evidence of stenosis involving proximal or distal cervical  internal carotid arteries or vertebral arteries. 4. Significant noncalcified atherosclerotic plaque is seen involving  thoracic aortic arch and at origin in of great vessels. CTA NECK Maurizio Alexandra [[id number]] Resulted: 19   Updated: 19    Narrative:     Patient MRN:  [id number]  : 1950  Age: 78 years  Gender: Male    Order Date:  2019 1:45 PM    EXAM: CTA HEAD W CONTRAST, CTA NECK W CONTRAST    COMPARISON: None    INDICATION:  stroke      TECHNIQUE: Unenhanced CT performed, then contiguous axial images  through the head and neck were obtained following intravenous contrast  using standard CT angiographic protocol. Sagittal and coronal MIPS  images were reconstructed from the axial acquisition. Additional 3-D  reconstructions were obtained to aid in interpretation of this  examination. Low-dose CT acquisition technique including one of the  following options: 1. Automated exposure control. 2. Adjustment of mA  and/or KV according to the patient's size. 3. Use of iterative  reconstruction. FINDINGS:    CT HEAD:    No acute intracranial hemorrhage or edema. No abnormal extra-axial  fluid collections. CTA HEAD:    Anterior cerebral arteries, middle cerebral arteries, and posterior  cerebral arteries are patent without evidence of stenosis. There is  normal vertebral basilar junction. Basilar artery is patent without  evidence of stenosis.  No evidence of intracranial aneurysm or AVM. CTA NECK:    Significant burden of noncalcified atherosclerotic plaque is seen at  the origin and in of brachiocephalic, left common, and left subclavian  arteries and thoracic aortic arch. Common carotid arteries are patent  without evidence of stenosis. Carotid bulbs are unremarkable. No  evidence of stenosis involving proximal or distal cervical internal  carotid arteries or vertebral arteries. 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 Impression:       1. No evidence of intracranial hemorrhage or edema. 2. No evidence of arterial stenosis at level of the [place]. 3. No evidence of stenosis involving proximal or distal cervical  internal carotid arteries or vertebral arteries. 4. Significant noncalcified atherosclerotic plaque is seen involving  thoracic aortic arch and at origin in of great vessels. Hemoglobin A1c [[id number]] (Abnormal) Collected: 19 173   Updated: 19 1805    Specimen Source: Blood     Hemoglobin A1C 8.5High  %   POCT Glucose [[id number]] (Abnormal) Collected: 19 173   Updated: 19 175     Meter Glucose 185High  mg/dL   MRSA SCREENING CULTURE ONLY [[id number]] Collected: 19 1558   Updated: 19 1613    Specimen Type: Nose    Specimen Source: Nares    CT Head WO Contrast [[id number]] Resulted: 19 1421   Updated: 19 1536    Narrative:     Patient MRN:  [id number]  : 1950  Age: 78 years  Gender: Male    Order Date:  2019 1:45 PM    EXAM: CT HEAD WO CONTRAST    COMPARISON: None    INDICATION:  stroke      TECHNIQUE: Axial unenhanced CT scanning was performed through the head  without the use of intravenous contrast. Low-dose CT acquisition  technique included one of the following options; 1. Automated exposure  control, 2. Adjustment of mA and/or kV according to the patient's size  or 3. Use of iterative reconstruction.       FINDINGS:     No evidence of acute intracranial hemorrhage

## 2019-07-08 NOTE — PLAN OF CARE
Problem: Falls - Risk of:  Goal: Will remain free from falls  Description  Will remain free from falls  7/7/2019 1858 by Kendall Romero RN  Outcome: Met This Shift  Goal: Absence of physical injury  Description  Absence of physical injury  7/7/2019 2114 by Caroline Castellano RN  Outcome: Met This Shift  7/7/2019 1858 by Kendall Romero RN  Outcome: Met This Shift     Problem: HEMODYNAMIC STATUS  Goal: Patient has stable vital signs and fluid balance  Outcome: Met This Shift     Problem: ACTIVITY INTOLERANCE/IMPAIRED MOBILITY  Goal: Mobility/activity is maintained at optimum level for patient  Outcome: Met This Shift     Problem: COMMUNICATION IMPAIRMENT  Goal: Ability to express needs and understand communication  Outcome: Met This Shift

## 2019-07-08 NOTE — PROGRESS NOTES
transfer 7/7   Unsupported sitting  7/7   Sit to stand transfers 5/7   Ambulation 5/7   Total  31/35     Patient education  Pt educated on PT role, safety during functional mobility, gait training, stair training. Pt and his wife educated on PT role, pt performance, dc planning, PT POC    Patient response to education:   Pt verbalized understanding Pt demonstrated skill Pt requires further education in this area   Yes Yes Reinforce      Comments:  Pt received sitting in chair with wife present and agreeable to PT evaluation with OT collaboration. Pt cleared for participation by RN prior to session. Vitals monitored during session. Initially pt /106. RN administered BP medication and after gathering social hx and PLOF, BP re-assessed at 149/82. BLE AROM, MMT, and coordination assessed in chair. No deficits noted. Pt assisted with pants donning and gown change. Pt ambulated in hallway and displays very mild unsteadiness with no LOB. Pt was letting RUE hang to side with minimal attention to RUE. Pt cued on using RUE and bringing awareness during mobility. Requires assistance with opening/closing doors with RUE. Performed x12 stairs using 2 rails and required physical assistance of RUE. Returned to room and performed standing ADLs at sink. Pt standing tolerance >12 min. Prior to returning to chair pt performed sit>supine and supine>sit from bed and displays no deficits of assisted needed. Pt transferred to chair. Pt left with call button in reach, lines attached, and needs met. Anticipate no PT needs at discharge with exception of outpatient therapy for RUE strength and coordination training. RN updated. Discussed pt case at interdisciplinary rounds in AM.     Pts/ family goals   1. Home     Patient and or family understand(s) diagnosis, prognosis, and plan of care. Yes     PLAN  PT care will be provided in accordance with the objectives noted above.   Whenever appropriate, clear delegation orders will be provided for nursing staff. Exercises and functional mobility practice will be used as well as appropriate assistive devices or modalities to obtain goals. Patient and family education will also be administered as needed. Frequency of treatments: 2-5x/week x 7-10 days.     Time in  0920  Time out  [address] [place] [address] Drive, PT, DPT  [id number]

## 2019-07-09 PROBLEM — I63.9 ACUTE CVA (CEREBROVASCULAR ACCIDENT) (HCC): Status: ACTIVE | Noted: 2019-07-09

## 2019-07-09 PROBLEM — I63.9 CEREBROVASCULAR ACCIDENT (CVA) (HCC): Status: RESOLVED | Noted: 2019-07-07 | Resolved: 2019-07-09

## 2019-07-09 PROBLEM — N18.30 CKD (CHRONIC KIDNEY DISEASE) STAGE 3, GFR 30-59 ML/MIN (HCC): Chronic | Status: ACTIVE | Noted: 2019-07-09

## 2019-07-09 PROBLEM — E78.5 HYPERLIPIDEMIA LDL GOAL <100: Chronic | Status: ACTIVE | Noted: 2019-07-09

## 2019-07-09 PROBLEM — I10 ESSENTIAL HYPERTENSION: Chronic | Status: ACTIVE | Noted: 2019-07-09

## 2019-07-09 LAB
METER GLUCOSE: 123 MG/DL (ref 74–99)
METER GLUCOSE: 127 MG/DL (ref 74–99)
METER GLUCOSE: 172 MG/DL (ref 74–99)
METER GLUCOSE: 227 MG/DL (ref 74–99)
MRSA CULTURE ONLY: NORMAL

## 2019-07-09 PROCEDURE — 82962 GLUCOSE BLOOD TEST: CPT

## 2019-07-09 PROCEDURE — 2060000000 HC ICU INTERMEDIATE R&B

## 2019-07-09 PROCEDURE — 6370000000 HC RX 637 (ALT 250 FOR IP): Performed by: INTERNAL MEDICINE

## 2019-07-09 PROCEDURE — 2580000003 HC RX 258: Performed by: INTERNAL MEDICINE

## 2019-07-09 RX ADMIN — FENOFIBRATE 160 MG: 160 TABLET ORAL at 08:34

## 2019-07-09 RX ADMIN — ATORVASTATIN CALCIUM 80 MG: 40 TABLET, FILM COATED ORAL at 08:34

## 2019-07-09 RX ADMIN — INSULIN LISPRO 1 UNITS: 100 INJECTION, SOLUTION INTRAVENOUS; SUBCUTANEOUS at 20:09

## 2019-07-09 RX ADMIN — ATENOLOL 50 MG: 50 TABLET ORAL at 08:34

## 2019-07-09 RX ADMIN — Medication 10 ML: at 08:35

## 2019-07-09 RX ADMIN — ALLOPURINOL 150 MG: 300 TABLET ORAL at 08:35

## 2019-07-09 RX ADMIN — DILTIAZEM HYDROCHLORIDE 240 MG: 240 CAPSULE, COATED, EXTENDED RELEASE ORAL at 08:34

## 2019-07-09 RX ADMIN — FERROUS SULFATE TAB 325 MG (65 MG ELEMENTAL FE) 325 MG: 325 (65 FE) TAB at 08:34

## 2019-07-09 RX ADMIN — Medication 10 ML: at 20:08

## 2019-07-09 RX ADMIN — INSULIN LISPRO 2 UNITS: 100 INJECTION, SOLUTION INTRAVENOUS; SUBCUTANEOUS at 11:47

## 2019-07-09 ASSESSMENT — PAIN SCALES - GENERAL
PAINLEVEL_OUTOF10: 0

## 2019-07-09 NOTE — DISCHARGE INSTR - COC
94%   BMI 26.94 kg/m²     Last documented pain score (0-10 scale): Pain Level: 0  Last Weight:   Wt Readings from Last 1 Encounters:   19 215 lb 8 oz (97.8 kg)     Mental Status:  {IP PT MENTAL STATUS:}    IV Access:  { RIANA IV ACCESS:239799737}    Nursing Mobility/ADLs:  Walking   {CHP DME CNLK:821183192}  Transfer  {P DME SHOS:165358162}  Bathing  {CHP DME YXAE:016561045}  Dressing  {CHP DME PSBA:171589267}  Toileting  {CHP DME ACLA:002403862}  Feeding  {CHP DME CCYI:297338838}  Med Admin  {P DME FWVP:814785526}  Med Delivery   {[facility] [place] [facility] Delivery:280648776}    Wound Care Documentation and Therapy:  Incision 16 Abdomen (Active)   Number of days: 6126        Elimination:  Continence:   · Bowel: {YES / Y}  · Bladder: {YES / CO:17583}  Urinary Catheter: {Urinary Catheter:502336030}   Colostomy/Ileostomy/Ileal Conduit: {YES / QF:21780}       Date of Last BM: ***    Intake/Output Summary (Last 24 hours) at 2019 1026  Last data filed at 2019 1012  Gross per 24 hour   Intake 485 ml   Output 1000 ml   Net -515 ml     I/O last 3 completed shifts:   In: 320 [P.O.:320]  Out: 1000 [Urine:1000]    Safety Concerns:     [employer] Safety Concerns:584654298}    Impairments/Disabilities:      [employer] Impairments/Disabilities:448164877}    Nutrition Therapy:  Current Nutrition Therapy:   [employer] Diet List:664543103}    Routes of Feeding: {[facility] DME Other Feedings:985341073}  Liquids: {Slp liquid thickness:58849}  Daily Fluid Restriction: {CHP DME Yes amt example:944545557}  Last Modified Barium Swallow with Video (Video Swallowing Test): {Done Not Done QAJN:028933277}    Treatments at the Time of Hospital Discharge:   Respiratory Treatments: ***  Oxygen Therapy:  {Therapy; copd oxygen:42485}  Ventilator:    { CC Vent MWUI:544706178}    Rehab Therapies: {THERAPEUTIC INTERVENTION:2103295840}  Weight Bearing Status/Restrictions: [address] IGNACIO Weight Bearin}  Other Medical Equipment (for

## 2019-07-09 NOTE — CARE COORDINATION
Met with patient & wife Shannon Winters at bedside to discuss role & transition of care. Patient admitted for acute onset of right arm and leg numbness and weakness. MRI Brain: Small acute infarcts. Largest is in left frontoparietal convexity. Per Neuro: embolic appearing stroke to distal posterior branch of LMCA. Post TPa. Patient lives with wife in a ranch style home with three steps to enter. He has a straight cane, wheel walker, bedside commode & shower chair. History of [address] rehab but can't remember the name of either. No history of inpatient rehab facilities. PCP is Dr. Eva Elizabeth is [facility]. Wife will transport home upon discharge. PT AM KORI 20 & OT AM KORI 17. Pt states he still has weakness in the right arm. Explain benefit of C PT/OT. Pt & wife want C PT/OT. [facility] list given to wife. She will give choice in am.  She wants to discuss choices with family members. Will need [facility] Orders for CPA Med compliance and PT/OT upon discharge. Plan is home with [facility].   Eric Montoya RN CM

## 2019-07-09 NOTE — PROGRESS NOTES
Subjective: The patient is awake and alert. No problems overnight. Denies chest pain, angina, and dyspnea. Denies abdominal pain. Tolerating diet. No nausea or vomiting. Objective:    /81   Pulse 76   Temp 98.5 °F (36.9 °C) (Temporal)   Resp 20   Ht 6' 3\" (1.905 m)   Wt 215 lb 8 oz (97.8 kg)   SpO2 94%   BMI 26.94 kg/m²     Current medications that patient is taking have been reviewed. Heart:  RRR, no murmurs, gallops, or rubs.   Lungs:  CTA bilaterally, no wheeze, rales or rhonchi  Abd: bowel sounds present, soft, nontender, nondistended, no masses  Extrem:  No cyanosis or edema    CBC with Differential:    Lab Results   Component Value Date    WBC 7.3 07/08/2019    RBC 4.65 07/08/2019    HGB 13.8 07/08/2019    HCT 42.0 07/08/2019     07/08/2019    MCV 90.3 07/08/2019    MCH 29.7 07/08/2019    MCHC 32.9 07/08/2019    RDW 14.6 07/08/2019    SEGSPCT 70 02/25/2014    LYMPHOPCT 18.5 07/08/2019    MONOPCT 6.9 07/08/2019    BASOPCT 1.0 07/08/2019    MONOSABS 0.50 07/08/2019    LYMPHSABS 1.34 07/08/2019    EOSABS 0.23 07/08/2019    BASOSABS 0.07 07/08/2019     CMP:    Lab Results   Component Value Date     07/08/2019    K 3.8 07/08/2019     07/08/2019    CO2 22 07/08/2019    BUN 34 07/08/2019    CREATININE 2.1 07/08/2019    GFRAA 38 07/08/2019    LABGLOM 31 07/08/2019    GLUCOSE 101 07/08/2019    GLUCOSE 102 05/02/2012    PROT 7.7 07/07/2019    LABALBU 4.5 07/07/2019    LABALBU 4.5 05/02/2012    CALCIUM 9.0 07/08/2019    BILITOT 0.4 07/07/2019    ALKPHOS 54 07/07/2019    AST 12 07/07/2019    ALT 11 07/07/2019     BMP:    Lab Results   Component Value Date     07/08/2019    K 3.8 07/08/2019     07/08/2019    CO2 22 07/08/2019    BUN 34 07/08/2019    LABALBU 4.5 07/07/2019    LABALBU 4.5 05/02/2012    CREATININE 2.1 07/08/2019    CALCIUM 9.0 07/08/2019    GFRAA 38 07/08/2019    LABGLOM 31 07/08/2019    GLUCOSE 101 07/08/2019    GLUCOSE 102 05/02/2012     Magnesium:

## 2019-07-10 PROBLEM — E78.2 MIXED HYPERLIPIDEMIA: Chronic | Status: ACTIVE | Noted: 2019-07-09

## 2019-07-10 LAB
METER GLUCOSE: 133 MG/DL (ref 74–99)
METER GLUCOSE: 155 MG/DL (ref 74–99)
METER GLUCOSE: 188 MG/DL (ref 74–99)
METER GLUCOSE: 227 MG/DL (ref 74–99)

## 2019-07-10 PROCEDURE — 97112 NEUROMUSCULAR REEDUCATION: CPT

## 2019-07-10 PROCEDURE — 82962 GLUCOSE BLOOD TEST: CPT

## 2019-07-10 PROCEDURE — 99232 SBSQ HOSP IP/OBS MODERATE 35: CPT | Performed by: NURSE PRACTITIONER

## 2019-07-10 PROCEDURE — 6370000000 HC RX 637 (ALT 250 FOR IP): Performed by: INTERNAL MEDICINE

## 2019-07-10 PROCEDURE — 6370000000 HC RX 637 (ALT 250 FOR IP): Performed by: NURSE PRACTITIONER

## 2019-07-10 PROCEDURE — 2060000000 HC ICU INTERMEDIATE R&B

## 2019-07-10 PROCEDURE — 2580000003 HC RX 258: Performed by: INTERNAL MEDICINE

## 2019-07-10 PROCEDURE — 97535 SELF CARE MNGMENT TRAINING: CPT

## 2019-07-10 RX ORDER — ASPIRIN 81 MG/1
81 TABLET, CHEWABLE ORAL DAILY
Status: DISCONTINUED | OUTPATIENT
Start: 2019-07-10 | End: 2019-07-11 | Stop reason: HOSPADM

## 2019-07-10 RX ADMIN — ATORVASTATIN CALCIUM 80 MG: 40 TABLET, FILM COATED ORAL at 09:27

## 2019-07-10 RX ADMIN — ASPIRIN 81 MG 81 MG: 81 TABLET ORAL at 16:04

## 2019-07-10 RX ADMIN — INSULIN LISPRO 1 UNITS: 100 INJECTION, SOLUTION INTRAVENOUS; SUBCUTANEOUS at 16:04

## 2019-07-10 RX ADMIN — DILTIAZEM HYDROCHLORIDE 240 MG: 240 CAPSULE, COATED, EXTENDED RELEASE ORAL at 09:28

## 2019-07-10 RX ADMIN — ATENOLOL 50 MG: 50 TABLET ORAL at 09:28

## 2019-07-10 RX ADMIN — INSULIN LISPRO 1 UNITS: 100 INJECTION, SOLUTION INTRAVENOUS; SUBCUTANEOUS at 20:40

## 2019-07-10 RX ADMIN — Medication 10 ML: at 20:39

## 2019-07-10 RX ADMIN — FERROUS SULFATE TAB 325 MG (65 MG ELEMENTAL FE) 325 MG: 325 (65 FE) TAB at 09:28

## 2019-07-10 RX ADMIN — Medication 10 ML: at 09:27

## 2019-07-10 RX ADMIN — INSULIN LISPRO 1 UNITS: 100 INJECTION, SOLUTION INTRAVENOUS; SUBCUTANEOUS at 11:52

## 2019-07-10 RX ADMIN — FENOFIBRATE 160 MG: 160 TABLET ORAL at 09:28

## 2019-07-10 RX ADMIN — ALLOPURINOL 150 MG: 300 TABLET ORAL at 09:28

## 2019-07-10 ASSESSMENT — PAIN SCALES - GENERAL
PAINLEVEL_OUTOF10: 0
PAINLEVEL_OUTOF10: 0

## 2019-07-10 NOTE — CARE COORDINATION
Met with patient at bedside, he said speak with his wife for [facility] choice. Spoke with wife Kalpesh Berumen via phone. She still has not chosen [address] but is working on it with a friend. She will call me with choice as soon as she has it. Await choice. Will continue to follow.   Kori Burdick RN CM

## 2019-07-10 NOTE — PROGRESS NOTES
device    demo Indep dynamic sitting balance EOB during ADL tasks; Mod I dynamic standing balance during ADL tasks using stable surface prn   Endurance/  Activity Tolerance F activity tolerance/endurance during light ADL task ; standing tolerance ~12 minutes  Fair+ demo G activity tolerance/endurance during [id number] min ADL task      G demo of EC, pacing and proper breathing techniques   Visual/  Perceptual Glasses: reading only     Visual scanning: WFL  Depth perception: WFL     Yes     Safety F  Multiple VC for RUE awareness and positioning during ADL routine and transfers; decreased sensation and proprioceptive awareness         Fair    Verbal prompts to improve hand placement and safety/incorpation of RUE.  G    RUE strength and [facility] skills See below   G tolerance to RUE AROM, strengthening and FMC exercises to improve overall UE use during ADLs and functional tasks      Hand dominance: right       Strength ROM  Comments   RUE  Proximal: 4/5 shoulder, elbow, forearm, wrist  Distal:   3/5 digits flexion/extension  Thumb 2/5 Proximal: WFL   Distal: WFL F-   F- FMC/dexterity noted during ADL tasks; gross stabilizer      Oppose thumb to digits 3 only. Awkward movements   LUE Proximal: 5/5  Distal: 5/5 Proximal:  -PROM: WFL   -AROM: WFL  Distal: WFL G   G FMC/dexterity noted during ADL tasks      Hearing: WFL   Sensation: Pt reports numbness/ tingling in RUE in shoulder. Tone: WFL  Edema: Unremarkable     Comments/Treatment Narrative: OK from RN to see patient. Upon arrival patient supine with HOB slightly elevated. Patient agreeable to session. Pt denies any changes in vision. Showing RUE distal weakness, decreased [facility] skills during ADL tasks and transfers. Pt completed UB/LB dressing tasks and grooming tasks while standing at sink. Transfers and functional mobility completed using no device as stated above. No dizziness noted. Standing tolerance ~15 minutes.  Red  tubing and therapuddy left in room

## 2019-07-10 NOTE — PROGRESS NOTES
[facility] Cardiac Electrophysiology    Full consult not needed. Patient with noted cryptogenic stroke. Plan for MALIK tomorrow and in no cardioembolic source, will plan for ILR implantation.     Omar Childress DO  [id number] [facility] Cardiac Electrophysiology  [address] [facility]

## 2019-07-10 NOTE — PLAN OF CARE
Problem: Falls - Risk of:  Goal: Will remain free from falls  Description  Will remain free from falls  Outcome: Met This Shift  Goal: Absence of physical injury  Description  Absence of physical injury  Outcome: Met This Shift     Problem: HEMODYNAMIC STATUS  Goal: Patient has stable vital signs and fluid balance  Outcome: Met This Shift

## 2019-07-11 ENCOUNTER — ANESTHESIA EVENT (OUTPATIENT)
Dept: CARDIAC CATH/INVASIVE PROCEDURES | Age: 69
DRG: 042 | End: 2019-07-11
Payer: COMMERCIAL

## 2019-07-11 ENCOUNTER — ANESTHESIA (OUTPATIENT)
Dept: CARDIAC CATH/INVASIVE PROCEDURES | Age: 69
DRG: 042 | End: 2019-07-11
Payer: COMMERCIAL

## 2019-07-11 VITALS
HEART RATE: 90 BPM | WEIGHT: 213.8 LBS | OXYGEN SATURATION: 96 % | HEIGHT: 75 IN | SYSTOLIC BLOOD PRESSURE: 147 MMHG | DIASTOLIC BLOOD PRESSURE: 90 MMHG | BODY MASS INDEX: 26.58 KG/M2 | RESPIRATION RATE: 22 BRPM | TEMPERATURE: 97.8 F

## 2019-07-11 VITALS
RESPIRATION RATE: 21 BRPM | SYSTOLIC BLOOD PRESSURE: 151 MMHG | DIASTOLIC BLOOD PRESSURE: 89 MMHG | OXYGEN SATURATION: 98 %

## 2019-07-11 LAB — METER GLUCOSE: 127 MG/DL (ref 74–99)

## 2019-07-11 PROCEDURE — 33285 INSJ SUBQ CAR RHYTHM MNTR: CPT | Performed by: INTERNAL MEDICINE

## 2019-07-11 PROCEDURE — 6370000000 HC RX 637 (ALT 250 FOR IP): Performed by: INTERNAL MEDICINE

## 2019-07-11 PROCEDURE — 82962 GLUCOSE BLOOD TEST: CPT

## 2019-07-11 PROCEDURE — 2580000003 HC RX 258: Performed by: ANESTHESIOLOGIST ASSISTANT

## 2019-07-11 PROCEDURE — 93325 DOPPLER ECHO COLOR FLOW MAPG: CPT

## 2019-07-11 PROCEDURE — C1764 EVENT RECORDER, CARDIAC: HCPCS

## 2019-07-11 PROCEDURE — 6370000000 HC RX 637 (ALT 250 FOR IP): Performed by: NURSE PRACTITIONER

## 2019-07-11 PROCEDURE — 2709999900 HC NON-CHARGEABLE SUPPLY

## 2019-07-11 PROCEDURE — 6360000002 HC RX W HCPCS: Performed by: ANESTHESIOLOGIST ASSISTANT

## 2019-07-11 PROCEDURE — 0JH632Z INSERTION OF MONITORING DEVICE INTO CHEST SUBCUTANEOUS TISSUE AND FASCIA, PERCUTANEOUS APPROACH: ICD-10-PCS | Performed by: INTERNAL MEDICINE

## 2019-07-11 PROCEDURE — 93312 ECHO TRANSESOPHAGEAL: CPT

## 2019-07-11 PROCEDURE — 2580000003 HC RX 258: Performed by: INTERNAL MEDICINE

## 2019-07-11 PROCEDURE — 93321 DOPPLER ECHO F-UP/LMTD STD: CPT

## 2019-07-11 PROCEDURE — 99231 SBSQ HOSP IP/OBS SF/LOW 25: CPT | Performed by: NURSE PRACTITIONER

## 2019-07-11 RX ORDER — SODIUM CHLORIDE 9 MG/ML
INJECTION, SOLUTION INTRAVENOUS CONTINUOUS PRN
Status: DISCONTINUED | OUTPATIENT
Start: 2019-07-11 | End: 2019-07-11 | Stop reason: SDUPTHER

## 2019-07-11 RX ORDER — PROPOFOL 10 MG/ML
INJECTION, EMULSION INTRAVENOUS PRN
Status: DISCONTINUED | OUTPATIENT
Start: 2019-07-11 | End: 2019-07-11 | Stop reason: SDUPTHER

## 2019-07-11 RX ORDER — ASPIRIN 81 MG/1
81 TABLET, CHEWABLE ORAL DAILY
Qty: 30 TABLET | Refills: 0 | Status: SHIPPED | OUTPATIENT
Start: 2019-07-12

## 2019-07-11 RX ADMIN — SODIUM CHLORIDE: 9 INJECTION, SOLUTION INTRAVENOUS at 09:44

## 2019-07-11 RX ADMIN — FERROUS SULFATE TAB 325 MG (65 MG ELEMENTAL FE) 325 MG: 325 (65 FE) TAB at 08:16

## 2019-07-11 RX ADMIN — Medication 10 ML: at 08:17

## 2019-07-11 RX ADMIN — ATENOLOL 50 MG: 50 TABLET ORAL at 08:16

## 2019-07-11 RX ADMIN — ATORVASTATIN CALCIUM 80 MG: 40 TABLET, FILM COATED ORAL at 08:16

## 2019-07-11 RX ADMIN — PROPOFOL 150 MG: 10 INJECTION, EMULSION INTRAVENOUS at 09:44

## 2019-07-11 RX ADMIN — ALLOPURINOL 150 MG: 300 TABLET ORAL at 08:17

## 2019-07-11 RX ADMIN — ASPIRIN 81 MG 81 MG: 81 TABLET ORAL at 08:18

## 2019-07-11 RX ADMIN — Medication 10 ML: at 08:20

## 2019-07-11 RX ADMIN — DILTIAZEM HYDROCHLORIDE 240 MG: 240 CAPSULE, COATED, EXTENDED RELEASE ORAL at 08:16

## 2019-07-11 RX ADMIN — FENOFIBRATE 160 MG: 160 TABLET ORAL at 08:17

## 2019-07-11 NOTE — CARE COORDINATION
Attempted to visit patient for [address] choice but he was down for testing. Will visit when he returns.   Kelvin Camilo RN CM

## 2019-07-11 NOTE — ANESTHESIA PRE PROCEDURE
0.9 % injection 10 mL  10 mL Intravenous PRN Ana Loose, DO   10 mL at 07/07/19 1814    sodium chloride flush 0.9 % injection 10 mL  10 mL Intravenous PRN Ana Loose, DO        diltiazem (CARDIZEM CD) extended release capsule 240 mg  240 mg Oral QAM Ana Loose, DO   240 mg at 07/10/19 0939    atorvastatin (LIPITOR) tablet 80 mg  80 mg Oral Daily Ana Loose, DO   80 mg at 07/10/19 6796    fenofibrate tablet 160 mg  160 mg Oral Daily Ana Loose, DO   160 mg at 07/10/19 6415    ferrous sulfate tablet 325 mg  325 mg Oral Daily Ana Loose, DO   325 mg at 07/10/19 5836    atenolol (TENORMIN) tablet 50 mg  50 mg Oral Daily Ana Loose, DO   50 mg at 07/10/19 9685    allopurinol (ZYLOPRIM) tablet 150 mg  150 mg Oral Daily Ana Loose, DO   150 mg at 07/10/19 3286    sodium chloride flush 0.9 % injection 10 mL  10 mL Intravenous 2 times per day Ana Loose, DO   10 mL at 07/09/19 2008    sodium chloride flush 0.9 % injection 10 mL  10 mL Intravenous PRN Ana Loose, DO        magnesium hydroxide (MILK OF MAGNESIA) 400 MG/5ML suspension 30 mL  30 mL Oral Daily PRN Ana Loose, DO        [facility]) injection 4 mg  4 mg Intravenous Q6H PRN Ana Loose, DO        glucose (GLUTOSE) 40 % oral gel 15 g  15 g Oral PRN Ana Loose, DO        dextrose 50 % IV solution  12.5 g Intravenous PRN Ana Loose, DO        glucagon (rDNA) injection 1 mg  1 mg Intramuscular PRN Ana Loose, DO        insulin lispro (HUMALOG) injection vial 0-6 Units  0-6 Units Subcutaneous TID WC Ana Loose, DO   1 Units at 07/10/19 1604    insulin lispro (HUMALOG) injection vial 0-3 Units  0-3 Units Subcutaneous Nightly Ana Loose, DO   1 Units at 07/10/19 2040    hydrALAZINE (APRESOLINE) injection 10 mg  10 mg Intravenous Q10 Min PRN Ana Loose, DO   10 mg at 07/08/19 2242    labetalol (NORMODYNE;TRANDATE) injection 10 mg  10 mg Intravenous Q10 Min PRN Ana Webber, 12.8 oz (97 kg)   16 233 lb 14.4 oz (106.1 kg)   16 232 lb 5 oz (105.4 kg)     Body mass index is 26.72 kg/m². CBC:   Lab Results   Component Value Date    WBC 7.3 2019    RBC 4.65 2019    HGB 13.8 2019    HCT 42.0 2019    MCV 90.3 2019    RDW 14.6 2019     2019       CMP:   Lab Results   Component Value Date     2019    K 3.8 2019     2019    CO2 22 2019    BUN 34 2019    CREATININE 2.1 2019    GFRAA 38 2019    LABGLOM 31 2019    GLUCOSE 101 2019    GLUCOSE 102 2012    PROT 7.7 2019    CALCIUM 9.0 2019    BILITOT 0.4 2019    ALKPHOS 54 2019    AST 12 2019    ALT 11 2019       POC Tests: No results for input(s): POCGLU, POCNA, POCK, POCCL, POCBUN, POCHEMO, POCHCT in the last 72 hours.     Coags:   Lab Results   Component Value Date    PROTIME 10.3 2019    INR 0.9 2019    APTT 27.7 2019       HCG (If Applicable): No results found for: PREGTESTUR, PREGSERUM, HCG, HCGQUANT     ABGs: No results found for: PHART, PO2ART, BEV7TJV, XII5SGF, BEART, A0AMIIZI     Type & Screen (If Applicable):  No results found for: LABABO, LABRH     EK19  Ventricular Rate 92  BPM Final 2019  2:13 PM HMHPEAPM   Atrial Rate 92  BPM Final 2019  2:13 PM HM[facility]PEAPM   P-R Interval 212  ms Final 2019  2:13 PM HM[facility]PEAPM   QRS Duration 154  ms Final 2019  2:13 PM [facility]PEAPM   Q-T Interval 390  ms Final 2019  2:13 PM [facility]PEAPM   QTc Calculation (Bazett) 482  ms Final 2019  2:13 PM HMHPEAPM   P [place] 68  degrees Final 2019  2:13 PM [facility]PEAPM   R Axis -94  degrees Final 2019  2:13 PM [facility]M   T [place] 50  degrees Final 2019  2:13 PM [facility]PEAPM   Testing Performed By     Lab - Abbreviation Name Director Address Valid Date Range   360-[facility]HPEAPM [facility] MUSE Unknown Unknown 16 07-Present   Narrative   Performed by:

## 2019-07-11 NOTE — PROGRESS NOTES
Emile Gongora is a 78 y.o. right handed male     Neurology is following for stroke    S/p tPA-- NIHSS 6    Pt presented to ED with c/o sudden R sided weakness, CTP confirmed posterior L frontal stroke    MRI confirmed L frontoparietal and R occipital strokes--concerning for CE source    Pt has no hx Afib    MALIK negative-- LINQ placed this AM     Pt reports he feels better and stronger today-- \"one day at a time\"    Looking forward to doing PT outpt    Medically stable at this time    No family at bedside currently    Objective:     BP (!) 147/90   Pulse 90   Temp 97.8 °F (36.6 °C)   Resp 22   Ht 6' 3\" (1.905 m)   Wt 213 lb 12.8 oz (97 kg)   SpO2 96%   BMI 26.72 kg/m²     General appearance: alert, appears stated age, cooperative and no distress  Head: normocephalic, without obvious abnormality, atraumatic  Eyes: conjunctivae/corneas clear.  .  Neck: symmetrical, trachea midline and thyroid not enlarged, symmetric  Extremities: normal, atraumatic, no cyanosis or edema  Skin: color, texture, turgor normal---no rashes or lesions      Mental Status: Alert, oriented x4, thought content appropriate, able to state current president      Appropriate attention/concentration  Intact fundus of knowledge  Intact memories    Speech: no dysarthria-- mumbles at baseline according to family   Language: no aphasias    Cranial Nerves:  I: smell NA   II: visual acuity  NA   II: visual fields Full to finger counting   II: pupils PERRL   III,VII: ptosis None   III,IV,VI: extraocular muscles  EOMI without nystagmus   V: mastication Normal   V: facial light touch sensation  LT to R face decreased compared to L    V,VII: corneal reflex     VII: facial muscle function - upper  Normal   VII: facial muscle function - lower Normal   VIII: hearing Normal   IX: soft palate elevation  Normal   IX,X: gag reflex    XI: trapezius strength  5/5   XI: sternocleidomastoid strength 5/5   XI: neck extension strength  5/5   XII: tongue

## 2019-07-11 NOTE — CARE COORDINATION
Discharge order noted. Wife Abilio Post called. Wife declined [facility]. She now wants outpatient PT/OT orders. Instructed her to obtain prescription from family physician. She states she understands. Plan is home today with outpatient PT/OT.   Suzanne Holloway

## 2019-07-17 ENCOUNTER — OFFICE VISIT (OUTPATIENT)
Dept: NEUROLOGY | Age: 69
End: 2019-07-17
Payer: COMMERCIAL

## 2019-07-17 VITALS
WEIGHT: 234 LBS | RESPIRATION RATE: 17 BRPM | SYSTOLIC BLOOD PRESSURE: 169 MMHG | HEART RATE: 62 BPM | DIASTOLIC BLOOD PRESSURE: 87 MMHG | HEIGHT: 75 IN | BODY MASS INDEX: 29.09 KG/M2

## 2019-07-17 DIAGNOSIS — R29.898 WEAKNESS OF RIGHT ARM: ICD-10-CM

## 2019-07-17 DIAGNOSIS — I63.9 CRYPTOGENIC STROKE (HCC): Primary | ICD-10-CM

## 2019-07-17 PROCEDURE — 3017F COLORECTAL CA SCREEN DOC REV: CPT | Performed by: NURSE PRACTITIONER

## 2019-07-17 PROCEDURE — 4040F PNEUMOC VAC/ADMIN/RCVD: CPT | Performed by: NURSE PRACTITIONER

## 2019-07-17 PROCEDURE — G8419 CALC BMI OUT NRM PARAM NOF/U: HCPCS | Performed by: NURSE PRACTITIONER

## 2019-07-17 PROCEDURE — 1036F TOBACCO NON-USER: CPT | Performed by: NURSE PRACTITIONER

## 2019-07-17 PROCEDURE — G8598 ASA/ANTIPLAT THER USED: HCPCS | Performed by: NURSE PRACTITIONER

## 2019-07-17 PROCEDURE — G8427 DOCREV CUR MEDS BY ELIG CLIN: HCPCS | Performed by: NURSE PRACTITIONER

## 2019-07-17 PROCEDURE — 99214 OFFICE O/P EST MOD 30 MIN: CPT | Performed by: NURSE PRACTITIONER

## 2019-07-17 PROCEDURE — 1111F DSCHRG MED/CURRENT MED MERGE: CPT | Performed by: NURSE PRACTITIONER

## 2019-07-17 PROCEDURE — 1123F ACP DISCUSS/DSCN MKR DOCD: CPT | Performed by: NURSE PRACTITIONER

## 2019-07-17 NOTE — PROGRESS NOTES
STROKE CLINIC VISIT      Case Gongora is a 78 y.o. right handed male     History of Present Illness:     Summary of Hospitalization:  He presented on 7/7/19 with sudden onset of right arm and leg weakness and numbness- NIHSS 6. CT head was negative and tPA was administered. MRI brain proved acute embolic strokes in multiple vascular territories. CTA showed no large vessel occlusions or significant stenosis. MALIK proved no embolic source and he underwent LINQ insertion. He was discharged home on 7/11/19. Stroke Risk Factors:  Diabetes mellitus  Hyperlipidemia  Hypertension     Stroke Clinic:  He presents today with his wife, both are excellent historians. He continues to note residual right hand weakness and is currently receiving outpatient OT once weekly. He also notes continued numbness to right hand and right upper extremity stating his right forearm has no sensory loss. He has since followed with his PCP and plans to see again in 3 months. He since started new blood pressure medication. He does have a cuff at home and reports blood pressures are typically around 135-140/70-80. His blood pressure is elevated today and he admits to taking his morning medications. He denies history of stroke or seizure. No reported history of cardiac issues. Denies heart palpitations, fluttering, skipped beats, shortness of breath or chest pain. He is scheduled for follow up with EP next week s/p LINQ insertion. He was not previously on antiplatelet and remains on aspirin since hospital discharge without reported difficulties. Current Functional Status(Modified Richelle Scale):   2=Slight disability; unable to carry out all previous activities, but able to look after own affairs without assistance    Review of Systems:     No chest pain, palpitations or shortness of breath  No vertigo, lightheadedness or loss of consciousness  No falls, tripping or stumbling  No incontinence of bowels or bladder  No numbness, tingling or focal arm/leg weakness  No swallowing or speech difficulties    ROS otherwise negative    Objective:     BP (!) 169/87 (Site: Left Upper Arm, Position: Sitting, Cuff Size: Large Adult)   Pulse 62   Resp 17   Ht 6' 3\" (1.905 m)   Wt 234 lb (106.1 kg)   BMI 29.25 kg/m²     General Appearance: alert, cooperative, no distress, appears stated age    Head: normocephalic, without obvious abnormality, atraumatic    Eyes: conjunctiva/corneas clear    Neck: supple, symmetrical, trachea midline, no carotid bruit    Lungs: clear to auscultation bilaterally, respirations unlabored    Heart: regular rate and rhythm, S1 and S2 normal   Extremities: normal, atraumatic, no cyanosis or edema   Pulses: 2+ and symmetric all extremities   Skin: color, texture and turgor normal, no rashes or lesions     Mental Status: alert and oriented, thought content appropriate  Speech: no dysarthria- mumbled at baseline per wife  Language: no aphasia    Cranial Nerves:  I: smell NA   II: visual acuity  NA   II: visual fields Full    II: pupils Equal and reactive    III,VII: ptosis None   III,IV,VI: extraocular muscles  Full ROM without nystagmus   V: mastication Normal   V: facial light touch sensation  Normal   V,VII: corneal reflex     VII: facial muscle function - upper  Normal   VII: facial muscle function - lower Normal   VIII: hearing Normal   IX: soft palate elevation  Normal   IX,X: gag reflex    XI: trapezius strength  5/5   XI: sternocleidomastoid strength 5/5   XI: neck extension strength  5/5   XII: tongue strength  Normal     Motor:  5/5 throughout except 4/5 R hand grasp   No abnormal movements  No drift   Normal bulk and tone     Sensory:  LT and PP decreased right hand and upper arm   Vibration normal    Coordination:   FN, FFM and AMY slightly decreased on the right  HS normal    Gait:  Normal    DTR:   1+ throughout      Laboratory/Radiology:     CBC:   Lab Results   Component Value Date    WBC 7.3 07/08/2019 factors and requires aggressive management to reduce his risk of future events. - Hypertension- elevated today however reports normal blood pressures at     home. I appreciate hypodensity on GRE within left frontotemporal infarct with     concern of hemorrhagic transformation. I also note 3 cortical microbleeds     concerning for chronic uncontrolled hypertension. He continues on new BP     medications and is to follow with his PCP closely. Encouraged continuing home     blood pressure monitoring.     - Hyperlipidemia, uncontrolled ()- remains on high-intensity statin and        reports medication compliance. Advised dietary recommendations. Goal < 70    - Diabetes mellitus, uncontrolled (A1c 8.5) - discussed dietary recommendations     and continued monitoring with goal < 7%     Follow up with neurology in 3-4 months    Ethan Fabian MSN, APRN, FNP-C  8:50 AM  7/17/19    I spent 25 minutes with this patient obtaining the HPI, discussing the exam as well as discussing our plan of action. All questions were answered prior to leaving my office.

## 2019-07-25 ENCOUNTER — NURSE ONLY (OUTPATIENT)
Dept: NON INVASIVE DIAGNOSTICS | Age: 69
End: 2019-07-25
Payer: COMMERCIAL

## 2019-07-25 DIAGNOSIS — Z95.818 STATUS POST PLACEMENT OF IMPLANTABLE LOOP RECORDER: Primary | ICD-10-CM

## 2019-07-25 DIAGNOSIS — I63.9 ACUTE CVA (CEREBROVASCULAR ACCIDENT) (HCC): ICD-10-CM

## 2019-07-25 PROCEDURE — 93291 INTERROG DEV EVAL SCRMS IP: CPT | Performed by: INTERNAL MEDICINE

## 2019-08-26 ENCOUNTER — NURSE ONLY (OUTPATIENT)
Dept: NON INVASIVE DIAGNOSTICS | Age: 69
End: 2019-08-26
Payer: COMMERCIAL

## 2019-08-26 ENCOUNTER — TELEPHONE (OUTPATIENT)
Dept: NON INVASIVE DIAGNOSTICS | Age: 69
End: 2019-08-26

## 2019-08-26 DIAGNOSIS — Z95.818 STATUS POST PLACEMENT OF IMPLANTABLE LOOP RECORDER: Primary | ICD-10-CM

## 2019-08-26 DIAGNOSIS — I63.9 ACUTE CVA (CEREBROVASCULAR ACCIDENT) (HCC): ICD-10-CM

## 2019-08-26 PROCEDURE — 93299 PR REM INTERROG ICPMS/SCRMS <30 D TECH REVIEW: CPT | Performed by: INTERNAL MEDICINE

## 2019-08-26 PROCEDURE — 93298 REM INTERROG DEV EVAL SCRMS: CPT | Performed by: INTERNAL MEDICINE

## 2019-09-10 ENCOUNTER — TELEPHONE (OUTPATIENT)
Dept: NON INVASIVE DIAGNOSTICS | Age: 69
End: 2019-09-10

## 2019-09-26 ENCOUNTER — NURSE ONLY (OUTPATIENT)
Dept: NON INVASIVE DIAGNOSTICS | Age: 69
End: 2019-09-26
Payer: COMMERCIAL

## 2019-09-26 DIAGNOSIS — Z95.818 STATUS POST PLACEMENT OF IMPLANTABLE LOOP RECORDER: Primary | ICD-10-CM

## 2019-09-26 DIAGNOSIS — I63.9 ACUTE CVA (CEREBROVASCULAR ACCIDENT) (HCC): ICD-10-CM

## 2019-09-26 PROCEDURE — 93298 REM INTERROG DEV EVAL SCRMS: CPT | Performed by: INTERNAL MEDICINE

## 2019-09-26 PROCEDURE — 93299 PR REM INTERROG ICPMS/SCRMS <30 D TECH REVIEW: CPT | Performed by: INTERNAL MEDICINE

## 2019-10-08 ENCOUNTER — TELEPHONE (OUTPATIENT)
Dept: NON INVASIVE DIAGNOSTICS | Age: 69
End: 2019-10-08

## 2019-10-18 ENCOUNTER — FOLLOWUP TELEPHONE ENCOUNTER (OUTPATIENT)
Dept: ICU | Age: 69
End: 2019-10-18

## 2019-10-28 ENCOUNTER — NURSE ONLY (OUTPATIENT)
Dept: NON INVASIVE DIAGNOSTICS | Age: 69
End: 2019-10-28
Payer: COMMERCIAL

## 2019-10-28 DIAGNOSIS — I63.9 ACUTE CVA (CEREBROVASCULAR ACCIDENT) (HCC): ICD-10-CM

## 2019-10-28 DIAGNOSIS — Z95.818 STATUS POST PLACEMENT OF IMPLANTABLE LOOP RECORDER: Primary | ICD-10-CM

## 2019-10-28 PROCEDURE — 93298 REM INTERROG DEV EVAL SCRMS: CPT | Performed by: INTERNAL MEDICINE

## 2019-10-28 PROCEDURE — 93299 PR REM INTERROG ICPMS/SCRMS <30 D TECH REVIEW: CPT | Performed by: INTERNAL MEDICINE

## 2019-11-11 ENCOUNTER — TELEPHONE (OUTPATIENT)
Dept: NON INVASIVE DIAGNOSTICS | Age: 69
End: 2019-11-11

## 2019-11-18 ENCOUNTER — OFFICE VISIT (OUTPATIENT)
Dept: NEUROLOGY | Age: 69
End: 2019-11-18
Payer: COMMERCIAL

## 2019-11-18 VITALS
HEIGHT: 75 IN | OXYGEN SATURATION: 94 % | RESPIRATION RATE: 18 BRPM | HEART RATE: 67 BPM | TEMPERATURE: 97.9 F | SYSTOLIC BLOOD PRESSURE: 150 MMHG | BODY MASS INDEX: 27.69 KG/M2 | DIASTOLIC BLOOD PRESSURE: 97 MMHG | WEIGHT: 222.7 LBS

## 2019-11-18 DIAGNOSIS — I63.9 CRYPTOGENIC STROKE (HCC): Primary | ICD-10-CM

## 2019-11-18 PROCEDURE — 99215 OFFICE O/P EST HI 40 MIN: CPT | Performed by: NURSE PRACTITIONER

## 2019-11-29 ENCOUNTER — NURSE ONLY (OUTPATIENT)
Dept: NON INVASIVE DIAGNOSTICS | Age: 69
End: 2019-11-29
Payer: COMMERCIAL

## 2019-11-29 DIAGNOSIS — Z95.818 STATUS POST PLACEMENT OF IMPLANTABLE LOOP RECORDER: Primary | ICD-10-CM

## 2019-11-29 DIAGNOSIS — I63.9 ACUTE CVA (CEREBROVASCULAR ACCIDENT) (HCC): ICD-10-CM

## 2019-11-29 PROCEDURE — 93298 REM INTERROG DEV EVAL SCRMS: CPT | Performed by: INTERNAL MEDICINE

## 2019-11-29 PROCEDURE — 93299 PR REM INTERROG ICPMS/SCRMS <30 D TECH REVIEW: CPT | Performed by: INTERNAL MEDICINE

## 2020-01-07 ENCOUNTER — NURSE ONLY (OUTPATIENT)
Dept: NON INVASIVE DIAGNOSTICS | Age: 70
End: 2020-01-07
Payer: COMMERCIAL

## 2020-01-07 PROCEDURE — 93298 REM INTERROG DEV EVAL SCRMS: CPT | Performed by: INTERNAL MEDICINE

## 2020-01-09 ENCOUNTER — HOSPITAL ENCOUNTER (OUTPATIENT)
Age: 70
Discharge: HOME | End: 2020-01-11
Payer: COMMERCIAL

## 2020-01-09 LAB
ALBUMIN SERPL-MCNC: 4.4 G/DL (ref 3.5–5.2)
ALP BLD-CCNC: 46 U/L (ref 40–129)
ALT SERPL-CCNC: 15 U/L (ref 0–40)
ANION GAP SERPL CALCULATED.3IONS-SCNC: 10 MMOL/L (ref 7–16)
AST SERPL-CCNC: 18 U/L (ref 0–39)
BACTERIA: NORMAL /HPF
BILIRUB SERPL-MCNC: 0.3 MG/DL (ref 0–1.2)
BILIRUBIN URINE: NEGATIVE
BLOOD, URINE: NEGATIVE
BUN BLDV-MCNC: 44 MG/DL (ref 8–23)
CALCIUM SERPL-MCNC: 10.2 MG/DL (ref 8.6–10.2)
CHLORIDE BLD-SCNC: 104 MMOL/L (ref 98–107)
CLARITY: CLEAR
CO2: 27 MMOL/L (ref 22–29)
COLOR: YELLOW
CREAT SERPL-MCNC: 2.9 MG/DL (ref 0.7–1.2)
GFR AFRICAN AMERICAN: 26
GFR NON-AFRICAN AMERICAN: 22 ML/MIN/1.73
GLUCOSE BLD-MCNC: 194 MG/DL (ref 74–99)
GLUCOSE URINE: >=1000 MG/DL
HCT VFR BLD CALC: 46.9 % (ref 37–54)
HEMOGLOBIN: 14.5 G/DL (ref 12.5–16.5)
KETONES, URINE: NEGATIVE MG/DL
LEUKOCYTE ESTERASE, URINE: NEGATIVE
MCH RBC QN AUTO: 29.2 PG (ref 26–35)
MCHC RBC AUTO-ENTMCNC: 30.9 % (ref 32–34.5)
MCV RBC AUTO: 94.4 FL (ref 80–99.9)
NITRITE, URINE: NEGATIVE
PARATHYROID HORMONE INTACT: 61 PG/ML (ref 15–65)
PDW BLD-RTO: 13.8 FL (ref 11.5–15)
PH UA: 5.5 (ref 5–9)
PHOSPHORUS: 3.3 MG/DL (ref 2.5–4.5)
PLATELET # BLD: 247 E9/L (ref 130–450)
PMV BLD AUTO: 11.2 FL (ref 7–12)
POTASSIUM SERPL-SCNC: 5.2 MMOL/L (ref 3.5–5)
PROTEIN UA: 30 MG/DL
RBC # BLD: 4.97 E12/L (ref 3.8–5.8)
RBC UA: NORMAL /HPF (ref 0–2)
SODIUM BLD-SCNC: 141 MMOL/L (ref 132–146)
SPECIFIC GRAVITY UA: 1.02 (ref 1–1.03)
TOTAL PROTEIN: 7.7 G/DL (ref 6.4–8.3)
UROBILINOGEN, URINE: 0.2 E.U./DL
WBC # BLD: 6.5 E9/L (ref 4.5–11.5)
WBC UA: NORMAL /HPF (ref 0–5)

## 2020-01-09 PROCEDURE — 85027 COMPLETE CBC AUTOMATED: CPT

## 2020-01-09 PROCEDURE — 84156 ASSAY OF PROTEIN URINE: CPT

## 2020-01-09 PROCEDURE — 82570 ASSAY OF URINE CREATININE: CPT

## 2020-01-09 PROCEDURE — 82044 UR ALBUMIN SEMIQUANTITATIVE: CPT

## 2020-01-09 PROCEDURE — 80053 COMPREHEN METABOLIC PANEL: CPT

## 2020-01-09 PROCEDURE — 83970 ASSAY OF PARATHORMONE: CPT

## 2020-01-09 PROCEDURE — 84100 ASSAY OF PHOSPHORUS: CPT

## 2020-01-09 PROCEDURE — 82306 VITAMIN D 25 HYDROXY: CPT

## 2020-01-09 PROCEDURE — 81001 URINALYSIS AUTO W/SCOPE: CPT

## 2020-01-10 LAB
CREATININE URINE: 142 MG/DL (ref 40–278)
MICROALBUMIN UR-MCNC: 285.9 MG/L
MICROALBUMIN/CREAT UR-RTO: 201.3 (ref 0–30)
PROTEIN PROTEIN: 52 MG/DL (ref 0–12)
PROTEIN/CREAT RATIO: 0.4
PROTEIN/CREAT RATIO: 0.4 (ref 0–0.2)
VITAMIN D 25-HYDROXY: 19 NG/ML (ref 30–100)

## 2020-01-24 NOTE — PROGRESS NOTES
See [employer] [place] report. Remote monitoring reviewed over a 90 day period. End of 90 day monitoring period date of service 1-7-2020.

## 2020-01-29 ENCOUNTER — TELEPHONE (OUTPATIENT)
Dept: NON INVASIVE DIAGNOSTICS | Age: 70
End: 2020-01-29

## 2020-01-29 NOTE — TELEPHONE ENCOUNTER
We have received your remote transmission. Our staff will contact you if there is anything that needs to be discussed. Your next appointment is February 10, 2020 for remote transmission. Patient verbalized understanding.

## 2020-02-10 ENCOUNTER — NURSE ONLY (OUTPATIENT)
Dept: NON INVASIVE DIAGNOSTICS | Age: 70
End: 2020-02-10
Payer: COMMERCIAL

## 2020-02-10 PROCEDURE — 93298 REM INTERROG DEV EVAL SCRMS: CPT | Performed by: INTERNAL MEDICINE

## 2020-02-24 ENCOUNTER — TELEPHONE (OUTPATIENT)
Dept: NEUROLOGY | Age: 70
End: 2020-02-24

## 2020-02-24 NOTE — TELEPHONE ENCOUNTER
Patient no showed 02/24/2020 with [facility]. Attempted to contact patient to reschedule the appointment, left message. No show letter sent.

## 2020-02-27 ENCOUNTER — HOSPITAL ENCOUNTER (OUTPATIENT)
Age: 70
Discharge: HOME | End: 2020-02-29
Payer: COMMERCIAL

## 2020-02-27 LAB
ALBUMIN SERPL-MCNC: 4.6 G/DL (ref 3.5–5.2)
ALP BLD-CCNC: 43 U/L (ref 40–129)
ALT SERPL-CCNC: 12 U/L (ref 0–40)
ANION GAP SERPL CALCULATED.3IONS-SCNC: 15 MMOL/L (ref 7–16)
AST SERPL-CCNC: 17 U/L (ref 0–39)
BILIRUB SERPL-MCNC: 0.4 MG/DL (ref 0–1.2)
BUN BLDV-MCNC: 42 MG/DL (ref 8–23)
CALCIUM SERPL-MCNC: 10.1 MG/DL (ref 8.6–10.2)
CHLORIDE BLD-SCNC: 100 MMOL/L (ref 98–107)
CO2: 26 MMOL/L (ref 22–29)
CREAT SERPL-MCNC: 3 MG/DL (ref 0.7–1.2)
GFR AFRICAN AMERICAN: 25
GFR NON-AFRICAN AMERICAN: 21 ML/MIN/1.73
GLUCOSE BLD-MCNC: 161 MG/DL (ref 74–99)
POTASSIUM SERPL-SCNC: 5.4 MMOL/L (ref 3.5–5)
SODIUM BLD-SCNC: 141 MMOL/L (ref 132–146)
TOTAL PROTEIN: 7.8 G/DL (ref 6.4–8.3)

## 2020-02-27 PROCEDURE — 80053 COMPREHEN METABOLIC PANEL: CPT

## 2020-03-12 ENCOUNTER — NURSE ONLY (OUTPATIENT)
Dept: NON INVASIVE DIAGNOSTICS | Age: 70
End: 2020-03-12
Payer: COMMERCIAL

## 2020-03-12 PROCEDURE — G2066 INTER DEVC REMOTE 30D: HCPCS | Performed by: INTERNAL MEDICINE

## 2020-03-12 PROCEDURE — 93298 REM INTERROG DEV EVAL SCRMS: CPT | Performed by: INTERNAL MEDICINE

## 2020-03-25 ENCOUNTER — TELEPHONE (OUTPATIENT)
Dept: NON INVASIVE DIAGNOSTICS | Age: 70
End: 2020-03-25

## 2020-03-25 NOTE — TELEPHONE ENCOUNTER
----- Message from Pradeep Andrade RN sent at 3/25/2020  8:58 AM EDT -----  Successful transmission received. Please call patient and give next appointment. Spoke to Antony Grey ed transmission received successful and next remote due on 4/13/20.

## 2020-03-25 NOTE — PROGRESS NOTES
See [employer] [place] report. Remote monitoring reviewed over a 30 day period. End of 30 day monitoring period date of service 3-.

## 2020-04-13 ENCOUNTER — NURSE ONLY (OUTPATIENT)
Dept: NON INVASIVE DIAGNOSTICS | Age: 70
End: 2020-04-13
Payer: COMMERCIAL

## 2020-04-13 PROCEDURE — 93298 REM INTERROG DEV EVAL SCRMS: CPT | Performed by: INTERNAL MEDICINE

## 2020-04-13 PROCEDURE — G2066 INTER DEVC REMOTE 30D: HCPCS | Performed by: INTERNAL MEDICINE

## 2020-04-29 ENCOUNTER — TELEPHONE (OUTPATIENT)
Dept: NON INVASIVE DIAGNOSTICS | Age: 70
End: 2020-04-29

## 2020-04-29 NOTE — TELEPHONE ENCOUNTER
----- Message from Roya Severino RN sent at 4/24/2020  9:53 AM EDT -----  Cem Andrade for OV, please schedule. Successful transmission received. Please call patient and give next appointment.

## 2020-04-29 NOTE — TELEPHONE ENCOUNTER
We have received your remote transmission. Our staff will contact you if there is anything that needs to be discussed. Your next appointment is May 18, 2020 for remote transmission. Left a detailed message on the patient's voicemail.

## 2020-05-18 ENCOUNTER — NURSE ONLY (OUTPATIENT)
Dept: NON INVASIVE DIAGNOSTICS | Age: 70
End: 2020-05-18
Payer: COMMERCIAL

## 2020-05-18 PROCEDURE — G2066 INTER DEVC REMOTE 30D: HCPCS | Performed by: INTERNAL MEDICINE

## 2020-05-18 PROCEDURE — 93298 REM INTERROG DEV EVAL SCRMS: CPT | Performed by: INTERNAL MEDICINE

## 2020-06-19 ENCOUNTER — NURSE ONLY (OUTPATIENT)
Dept: NON INVASIVE DIAGNOSTICS | Age: 70
End: 2020-06-19
Payer: COMMERCIAL

## 2020-06-19 PROCEDURE — 93298 REM INTERROG DEV EVAL SCRMS: CPT | Performed by: INTERNAL MEDICINE

## 2020-06-19 PROCEDURE — G2066 INTER DEVC REMOTE 30D: HCPCS | Performed by: INTERNAL MEDICINE

## 2020-07-21 ENCOUNTER — NURSE ONLY (OUTPATIENT)
Dept: NON INVASIVE DIAGNOSTICS | Age: 70
End: 2020-07-21
Payer: COMMERCIAL

## 2020-07-21 PROCEDURE — 93298 REM INTERROG DEV EVAL SCRMS: CPT | Performed by: INTERNAL MEDICINE

## 2020-07-21 PROCEDURE — G2066 INTER DEVC REMOTE 30D: HCPCS | Performed by: INTERNAL MEDICINE

## 2020-07-22 ENCOUNTER — HOSPITAL ENCOUNTER (OUTPATIENT)
Age: 70
Discharge: HOME | End: 2020-07-24
Payer: COMMERCIAL

## 2020-07-22 LAB
ALBUMIN SERPL-MCNC: 4.6 G/DL (ref 3.5–5.2)
ALP BLD-CCNC: 45 U/L (ref 40–129)
ALT SERPL-CCNC: 13 U/L (ref 0–40)
ANION GAP SERPL CALCULATED.3IONS-SCNC: 15 MMOL/L (ref 7–16)
AST SERPL-CCNC: 16 U/L (ref 0–39)
BILIRUB SERPL-MCNC: 0.3 MG/DL (ref 0–1.2)
BUN BLDV-MCNC: 46 MG/DL (ref 8–23)
CALCIUM SERPL-MCNC: 9.7 MG/DL (ref 8.6–10.2)
CHLORIDE BLD-SCNC: 102 MMOL/L (ref 98–107)
CHOLESTEROL, TOTAL: 248 MG/DL (ref 0–199)
CO2: 23 MMOL/L (ref 22–29)
CREAT SERPL-MCNC: 2.8 MG/DL (ref 0.7–1.2)
CREATININE URINE: 130 MG/DL (ref 40–278)
GFR AFRICAN AMERICAN: 27
GFR NON-AFRICAN AMERICAN: 22 ML/MIN/1.73
GLUCOSE BLD-MCNC: 189 MG/DL (ref 74–99)
HBA1C MFR BLD: 8.2 % (ref 4–5.6)
HCT VFR BLD CALC: 43.5 % (ref 37–54)
HDLC SERPL-MCNC: 30 MG/DL
HEMOGLOBIN: 13.8 G/DL (ref 12.5–16.5)
LDL CHOLESTEROL CALCULATED: ABNORMAL MG/DL (ref 0–99)
MCH RBC QN AUTO: 29.3 PG (ref 26–35)
MCHC RBC AUTO-ENTMCNC: 31.7 % (ref 32–34.5)
MCV RBC AUTO: 92.4 FL (ref 80–99.9)
MICROALBUMIN UR-MCNC: 139.1 MG/L
MICROALBUMIN/CREAT UR-RTO: 107 (ref 0–30)
PARATHYROID HORMONE INTACT: 87 PG/ML (ref 15–65)
PDW BLD-RTO: 14.4 FL (ref 11.5–15)
PHOSPHORUS: 3.3 MG/DL (ref 2.5–4.5)
PLATELET # BLD: 287 E9/L (ref 130–450)
PMV BLD AUTO: 10.9 FL (ref 7–12)
POTASSIUM SERPL-SCNC: 5 MMOL/L (ref 3.5–5)
RBC # BLD: 4.71 E12/L (ref 3.8–5.8)
SODIUM BLD-SCNC: 140 MMOL/L (ref 132–146)
TOTAL PROTEIN: 7.4 G/DL (ref 6.4–8.3)
TRIGL SERPL-MCNC: 437 MG/DL (ref 0–149)
VITAMIN D 25-HYDROXY: 23 NG/ML (ref 30–100)
VLDLC SERPL CALC-MCNC: ABNORMAL MG/DL
WBC # BLD: 6.3 E9/L (ref 4.5–11.5)

## 2020-07-22 PROCEDURE — 82306 VITAMIN D 25 HYDROXY: CPT

## 2020-07-22 PROCEDURE — 80061 LIPID PANEL: CPT

## 2020-07-22 PROCEDURE — 83970 ASSAY OF PARATHORMONE: CPT

## 2020-07-22 PROCEDURE — 84100 ASSAY OF PHOSPHORUS: CPT

## 2020-07-22 PROCEDURE — 80053 COMPREHEN METABOLIC PANEL: CPT

## 2020-07-22 PROCEDURE — 83036 HEMOGLOBIN GLYCOSYLATED A1C: CPT

## 2020-07-22 PROCEDURE — 82570 ASSAY OF URINE CREATININE: CPT

## 2020-07-22 PROCEDURE — 85027 COMPLETE CBC AUTOMATED: CPT

## 2020-07-22 PROCEDURE — 82044 UR ALBUMIN SEMIQUANTITATIVE: CPT

## 2020-07-27 NOTE — PROGRESS NOTES
See [employer] [place] report. Remote monitoring reviewed over a 30 day period. End of 30 day monitoring period date of service 7.21.2020.

## 2020-07-28 ENCOUNTER — OFFICE VISIT (OUTPATIENT)
Dept: NEUROLOGY | Age: 70
End: 2020-07-28
Payer: COMMERCIAL

## 2020-07-28 VITALS
BODY MASS INDEX: 26.36 KG/M2 | DIASTOLIC BLOOD PRESSURE: 79 MMHG | OXYGEN SATURATION: 94 % | SYSTOLIC BLOOD PRESSURE: 140 MMHG | TEMPERATURE: 97.2 F | HEART RATE: 63 BPM | HEIGHT: 75 IN | WEIGHT: 212 LBS

## 2020-07-28 PROCEDURE — 99214 OFFICE O/P EST MOD 30 MIN: CPT | Performed by: NURSE PRACTITIONER

## 2020-07-28 PROCEDURE — 3052F HG A1C>EQUAL 8.0%<EQUAL 9.0%: CPT | Performed by: NURSE PRACTITIONER

## 2020-07-28 RX ORDER — EMPAGLIFLOZIN 10 MG/1
10 TABLET, FILM COATED ORAL DAILY
COMMUNITY

## 2020-07-28 NOTE — PROGRESS NOTES
[address] [facility]. Olinda Walton M.D., F.A.C.P. Vu Marin, CATHY, APRN, CNS  Abdirahman Lozano. Gautam Looney, MSN, APRN-FNP-C  Marcos Andrew MSN, APRN, FNP-C  Luis ESTRELLA PA-C  [address] MSN, APRN, FNP-C  [address] [facility] [place] [address]  [place], [address]  Phone: [phone]  Fax: [phone]       Benigno Gongora is a 69 y.o. right handed male although ambidextrous as well     Neurology is following for stroke    Patient presented to his appointment today with his wife. Both are great historians. Patient initially presented to [facility] ED on 7-7-2019 with complaint of sudden onset right arm and leg weakness and numbness. NIHSS was 6 in the ED. CT head was negative for hemorrhage and TPA was administered. MRI brain showed acute embolic strokes in multiple vascular distributions including left frontal parietal and right occipital.  CTA showed no large vessel occlusions or significant stenosis. MALIK showed no embolic source and he underwent LINQ insertion. He was discharged home on 7/11/19. Patient remains on aspirin and statin for secondary stroke prevention. Stroke risk factors include diabetes mellitus, hyperlipidemia, hypertension and former tobacco use. Since discharge patient continues to have residual right thumb and pointer finger numbness, weakness has improved significantly. She plans to have blood work with his PCP soon, last A1c 8.5 and . Patient has tingling which has been interrogated since discharge with no findings as of yet, next follow-up in January. Patient completed physical therapy in August 2019 of a total of 4-5 visits since discharge. He denies prior history of stroke or seizure. No reported history of cardiac issues. Denies heart palpitations, fluttering, skipped beats, shortness of breath or chest pain.     In addition to recent stroke patient also has history of hypertension, hyperlipidemia, diabetes mellitus, abdominal aortic aneurysm status post repair and renal insufficiency all managed by patient's PCP and urology. Patient reports that he quit smoking in July after his stroke. He has a 10.00 pack-year smoking history. He has never used smokeless tobacco. He reports current alcohol, approximately 2 times a week socially. He reports that he does not use drugs. She reports he sleeps 4 to 5 hours per night with naps during the day. Patient complains of fatigue. Patient drinks 2 cups of coffee daily. Patient drinks diet soda approximately 1 time per week. Patient drinks 8 glasses of water daily. Patient denies any recent stress. Patient is  and lives with family. Patient is a retired . Since patient's last visit he has had no additional complaints of recurrent stroke symptoms. Patient has residual right finger numbness otherwise doing well. Patient has had no falls or additional medical issues. Patient has maintained he is safe during COVID-19.    (+) Right thumb and pointer finger numbness  No chest pain or palpitations  No SOB  No vertigo, lightheadedness or loss of consciousness  No falls, tripping or stumbling  No incontinence of bowels or bladder  No itching or bruising appreciated  No numbness, tingling or focal arm/leg weakness    ROS is otherwise negative    Objective:     BP (!) 140/79 (Site: Right Upper Arm)   Pulse 63   Temp 97.2 °F (36.2 °C)   Ht 6' 3\" (1.905 m)   Wt 212 lb (96.2 kg)   SpO2 94%   BMI 26.50 kg/m²     General appearance: alert, appears stated age, cooperative and in no distress  Head: normocephalic, without obvious abnormality, atraumatic  Eyes: conjunctivae/corneas clear; no drainage  Neck: no carotid bruit, supple, symmetrical, trachea midline  Back: symmetric, no curvature.  ROM normal.   Lungs: clear to auscultation bilaterally, unlabored breaths  Heart: regular rate and rhythm  Abdomen: soft, non-tender; bowel sounds normal  Extremities: normal, atraumatic, no cyanosis or edema  Pulses: 2+ and symmetric  Skin:  color, texture, turgor normal--no rashes or lesions      Mental Status: alert and oriented x 4; pleasant    Appropriate attention/concentration  Intact fundus of knowledge  Memories intact    Speech: no dysarthria  Language: no aphasias---reading, writing, repetition, and object identification intact    Cranial Nerves:  I: smell  intact   II: visual acuity     II: visual fields Full to confrontation   II: pupils PERRL   III,VII: ptosis None   III,IV,VI: extraocular muscles   EOMI with ? right gaze nystagmus noted   V: mastication Normal   V: facial light touch sensation  Normal   V,VII: corneal reflex     VII: facial muscle function - upper  Normal   VII: facial muscle function - lower Normal   VIII: hearing Normal   IX: soft palate elevation  Normal   IX,X: gag reflex    XI: trapezius strength  5/5   XI: sternocleidomastoid strength 5/5   XI: neck extension strength  5/5   XII: tongue strength  Normal     Motor:  5/5 throughout  Equal handgrips 5/5  Normal bulk and tone  No drift   No abnormal movements    Sensory:  LT and PP normal  Vibration decreased to right knee due to knee replacement--- improved today from prior visit    Coordination:   FN, FFM and AMY normal  HS normal    Gait:  Normal  Walks well on toes, heels, and tandem    DTR:   Right Brachioradialis reflex 2+  Left Brachioradialis reflex 2+  Right Biceps reflex 2+  Left Biceps reflex 2+  Right Triceps reflex 2+  Left Triceps reflex 2+  Right Quadriceps reflex 0  Left Quadriceps reflex 0  Right Achilles reflex 0  Left Achilles reflex 0    No Babinskis    No other pathological reflexes    Laboratory/Radiology:  ry/Radiology:     CBC:   Lab Results   Component Value Date    WBC 6.3 07/22/2020    RBC 4.71 07/22/2020    HGB 13.8 07/22/2020    HCT 43.5 07/22/2020    MCV 92.4 07/22/2020    MCH 29.3 07/22/2020    MCHC 31.7 07/22/2020    RDW 14.4 07/22/2020     07/22/2020    MPV 10.9 07/22/2020     Lab Results   Component Value Date     07/22/2020    K 5.0 07/22/2020     07/22/2020    CO2 23 07/22/2020    BUN 46 (H) 07/22/2020    CREATININE 2.8 (H) 07/22/2020    GLUCOSE 189 (H) 07/22/2020    CALCIUM 9.7 07/22/2020    PROT 7.4 07/22/2020    LABALBU 4.6 07/22/2020    BILITOT 0.3 07/22/2020    ALKPHOS 45 07/22/2020    AST 16 07/22/2020    ALT 13 07/22/2020    LABGLOM 22 07/22/2020    GFRAA 27 07/22/2020     Lab Results   Component Value Date    ALKPHOS 45 07/22/2020    ALT 13 07/22/2020    AST 16 07/22/2020    PROT 7.4 07/22/2020    BILITOT 0.3 07/22/2020    LABALBU 4.6 07/22/2020     U/A:    Lab Results   Component Value Date    COLORU Yellow 01/09/2020    PROTEINU 30 01/09/2020    PHUR 5.5 01/09/2020    WBCUA 0-1 01/09/2020    RBCUA 0-1 01/09/2020    BACTERIA NONE 01/09/2020    CLARITYU Clear 01/09/2020    SPECGRAV 1.020 01/09/2020    LEUKOCYTESUR Negative 01/09/2020    UROBILINOGEN 0.2 01/09/2020    BILIRUBINUR Negative 01/09/2020    BLOODU Negative 01/09/2020    GLUCOSEU >=1000 01/09/2020     HgBA1c:    Lab Results   Component Value Date    LABA1C 8.2 07/22/2020     FLP:    Lab Results   Component Value Date    TRIG 307 07/22/2020    HDL 30 07/22/2020    [address] - 07/22/2020    LABVLDL - 07/22/2020     CT head without contrast 7-7-2019 no evidence of acute intracranial hemorrhage or edema. CTA head and neck with contrast 7-7-2019  1. No evidence of intracranial hemorrhage or edema.       2. No evidence of arterial stenosis at level of the [place].       3. No evidence of stenosis involving proximal or distal cervical   internal carotid arteries or vertebral arteries.       4. Significant noncalcified atherosclerotic plaque is seen involving   thoracic aortic arch and at origin in of great vessels. CT brain perfusion 7-7-2019  Findings suggest recent stroke involving posterior left frontal lobe. Findings called to Dr. Sophia Coon at 2:24 PM 7/7/2019.      MRI brain without contrast 7-8-2019  Small acute infarcts. Largest is in left frontoparietal convexity and   a smaller one is in the right occipital lobe. Multiplicity and   bilaterality raises the possibility of cardiac embolic source. Clinical correlation is recommended     MALIK 7-  No source of embolus identified. Overall ejection fraction is normal .   Normal right ventricle structure and function. Physiologic and/or trace mitral regurgitation is present. No evidence of thrombus within left atrium or appendage. Agitated saline injected for shunt evaluation. No evidence of patent foramen ovale. No evidence of atrial septal defect. All labs and images were personally reviewed at the time of this visit    Assessment:     Acute bilateral embolic-appearing infarcts suspicious for cardioembolic source in July 1378   Patient has had good recovery--right thumb and pointer finger numbness only   So far Linq has not revealed any arrhythmias--patient follows up with EP regularly   Pt remains on aspirin and statin for secondary stroke prevention   Patient has completed PT/OT since discharge   Patient continues to work on his risk factors including hypertension, hyperlipidemia, diabetes; patient has quit smoking--- in July A1c 8.2, LDL panic    Chronic sleep deprivation   Patient complains of fatigue   Patient reports 4 to 5 hours of sleep per night; recommended melatonin   Recommended eliminating naps during the day which he has improved since last visit    Renal insufficiency   Managed by patient's urologist    Vitamin D deficiency   Vitamin D supplements per PCP    Patient has been doing well with no new medical issues or recurrent stroke symptoms. Patient continues to work on stroke risk factors which were reiterated during visit with latest A1c and LDL. Exercise is encouraged as well as better diet.     Plan:     Recommend over-the-counter melatonin  Continue aspirin and statin  Follow-up with EP for Linq interrogations per their recommendations  Continue working on risk factors and lifestyle modification  Follow-up with in 1 year  Call with any issues      CHELSEA Cabezas NP-C  9:48 AM  7/28/2020    I spent 35 minutes with this patient obtaining the HPI and discussing the exam with greater than 50% of the time providing counseling and education on medications and other treatment plans. All questions were answered prior to leaving my office.

## 2020-08-17 ENCOUNTER — TELEPHONE (OUTPATIENT)
Dept: NON INVASIVE DIAGNOSTICS | Age: 70
End: 2020-08-17

## 2020-08-17 NOTE — TELEPHONE ENCOUNTER
----- Message from Maria T Green RN sent at 6/24/2020  3:09 PM EDT -----  Successful transmission received. Please call patient and give next appointment.

## 2020-08-17 NOTE — TELEPHONE ENCOUNTER
We have received your remote transmission. Our staff will contact you if there is anything that needs to be discussed. Your next appointment is 8/24/20. Spoke with patient and verbalized understanding.

## 2020-08-18 ENCOUNTER — TELEPHONE (OUTPATIENT)
Dept: NON INVASIVE DIAGNOSTICS | Age: 70
End: 2020-08-18

## 2020-08-18 NOTE — TELEPHONE ENCOUNTER
----- Message from Elizabeth Ruiz RN sent at 7/27/2020 11:12 AM EDT -----  Successful transmission received. Please call patient and give next appointment.

## 2020-08-24 ENCOUNTER — NURSE ONLY (OUTPATIENT)
Dept: NON INVASIVE DIAGNOSTICS | Age: 70
End: 2020-08-24
Payer: COMMERCIAL

## 2020-08-24 PROCEDURE — 93298 REM INTERROG DEV EVAL SCRMS: CPT | Performed by: INTERNAL MEDICINE

## 2020-08-24 PROCEDURE — G2066 INTER DEVC REMOTE 30D: HCPCS | Performed by: INTERNAL MEDICINE

## 2020-08-25 NOTE — PROGRESS NOTES
See [employer] [place] report. Remote monitoring reviewed over a 30 day period.   End of 30 day monitoring period date of service 8-    [employer] Linq ILR  DOI 7/11/19  Presenting rhythm: VS, HR ~84 bpm  Battery status: OK  Arrhythmia: none  Patient triggered events: none     Medications  -ASA 81 mg QD  -Tenormin 25 mg QD   -Cardizem CD  mg QD  -Lipitor 80 mg QD  -Tricor 145 mg QD     Plan  -31 day remote transmission  -OV recall with Dr Alma Montaño (scheduling)       Lan Garcia APRN-CNP, [address] [facility]

## 2020-09-01 ENCOUNTER — TELEPHONE (OUTPATIENT)
Dept: NON INVASIVE DIAGNOSTICS | Age: 70
End: 2020-09-01

## 2020-09-01 NOTE — TELEPHONE ENCOUNTER
----- Message from Thanh Pena RN sent at 9/1/2020  2:33 PM EDT -----  Successful transmission received. Please call patient and give next appointment.

## 2020-09-01 NOTE — TELEPHONE ENCOUNTER
We have received your remote transmission. Our staff will contact you if there is anything that needs to be discussed. Your next appointment is 09/29/2020 remote transmission from home. Left message with the next remote transmission date.

## 2020-09-29 ENCOUNTER — NURSE ONLY (OUTPATIENT)
Dept: NON INVASIVE DIAGNOSTICS | Age: 70
End: 2020-09-29
Payer: COMMERCIAL

## 2020-09-29 PROCEDURE — 93298 REM INTERROG DEV EVAL SCRMS: CPT | Performed by: INTERNAL MEDICINE

## 2020-09-29 PROCEDURE — G2066 INTER DEVC REMOTE 30D: HCPCS | Performed by: INTERNAL MEDICINE

## 2020-09-30 NOTE — PROGRESS NOTES
See [employer] [place] report. Remote monitoring reviewed over a 30 day period.   End of 30 day monitoring period date of service 9-       [employer] Linq ILR  DOI 7/11/19  Presenting rhythm: VS, HR ~73 bpm  Battery status: OK  Arrhythmia: none  Patient triggered events: none     Medications  -ASA 81 mg QD  -Tenormin 25 mg -2 tabs QD (?patient taking)  -Cardizem CD  mg QD  -Lipitor 80 mg QD  -Tricor 145 mg QD     Plan  -31 day remote transmission  -OV recall with Dr Charity Hernandez (scheduling)        CHELSEA Vinson-CNP, [address] [facility]

## 2020-10-27 ENCOUNTER — TELEPHONE (OUTPATIENT)
Dept: NON INVASIVE DIAGNOSTICS | Age: 70
End: 2020-10-27

## 2020-10-27 NOTE — TELEPHONE ENCOUNTER
----- Message from Kavin Peabody, RN sent at 10/21/2020  9:02 AM EDT -----  Successful transmission received. Please call patient and give next appointment.

## 2020-10-27 NOTE — TELEPHONE ENCOUNTER
We have received your remote transmission. Our staff will contact you if there is anything that needs to be discussed. Your next appointment is 11/03/2020 remote transmission from home. Left message with the next remote transmission date.

## 2020-11-03 ENCOUNTER — NURSE ONLY (OUTPATIENT)
Dept: NON INVASIVE DIAGNOSTICS | Age: 70
End: 2020-11-03
Payer: COMMERCIAL

## 2020-11-03 PROCEDURE — G2066 INTER DEVC REMOTE 30D: HCPCS | Performed by: INTERNAL MEDICINE

## 2020-11-03 PROCEDURE — 93298 REM INTERROG DEV EVAL SCRMS: CPT | Performed by: INTERNAL MEDICINE

## 2020-11-16 ENCOUNTER — TELEPHONE (OUTPATIENT)
Dept: NON INVASIVE DIAGNOSTICS | Age: 70
End: 2020-11-16

## 2020-11-16 NOTE — PROGRESS NOTES
See [employer] [place] report. Remote monitoring reviewed over a 30 day period. End of 30 day monitoring period date of service 11.2.2020.

## 2020-12-04 ENCOUNTER — NURSE ONLY (OUTPATIENT)
Dept: NON INVASIVE DIAGNOSTICS | Age: 70
End: 2020-12-04
Payer: COMMERCIAL

## 2020-12-04 PROCEDURE — G2066 INTER DEVC REMOTE 30D: HCPCS | Performed by: INTERNAL MEDICINE

## 2020-12-04 PROCEDURE — 93298 REM INTERROG DEV EVAL SCRMS: CPT | Performed by: INTERNAL MEDICINE

## 2020-12-22 ENCOUNTER — TELEPHONE (OUTPATIENT)
Dept: NON INVASIVE DIAGNOSTICS | Age: 70
End: 2020-12-22

## 2020-12-22 NOTE — PROGRESS NOTES
See [employer] [place] report. Remote monitoring reviewed over a 30 day period. End of 30 day monitoring period date of service 12.4.2020.

## 2020-12-22 NOTE — TELEPHONE ENCOUNTER
----- Message from Steve Joseph RN sent at 12/22/2020 10:13 AM EST -----  Successful transmission received. Please call patient and give next appointment.

## 2020-12-22 NOTE — TELEPHONE ENCOUNTER
We have received your remote transmission. Our staff will contact you if there is anything that needs to be discussed. Your next appointment is 01/04/2021 remote transmission from home. Left message with the next remote transmission date.

## 2021-01-05 ENCOUNTER — NURSE ONLY (OUTPATIENT)
Dept: NON INVASIVE DIAGNOSTICS | Age: 71
End: 2021-01-05
Payer: COMMERCIAL

## 2021-01-05 DIAGNOSIS — I63.9 ACUTE CVA (CEREBROVASCULAR ACCIDENT) (HCC): ICD-10-CM

## 2021-01-05 DIAGNOSIS — Z95.818 STATUS POST PLACEMENT OF IMPLANTABLE LOOP RECORDER: Primary | ICD-10-CM

## 2021-01-05 PROCEDURE — G2066 INTER DEVC REMOTE 30D: HCPCS | Performed by: INTERNAL MEDICINE

## 2021-01-05 PROCEDURE — 93298 REM INTERROG DEV EVAL SCRMS: CPT | Performed by: INTERNAL MEDICINE

## 2021-01-13 NOTE — PROGRESS NOTES
See [employer] [place] report. Remote monitoring reviewed over a 30 day period. End of 30 day monitoring period date of service  1.5.2021.

## 2021-01-14 ENCOUNTER — TELEPHONE (OUTPATIENT)
Dept: NON INVASIVE DIAGNOSTICS | Age: 71
End: 2021-01-14

## 2021-01-14 NOTE — TELEPHONE ENCOUNTER
----- Message from Hector Berrios RN sent at 1/13/2021 10:58 AM EST -----  Successful transmission received. Please call patient and give next appointment.

## 2021-01-14 NOTE — TELEPHONE ENCOUNTER
We have received your remote transmission. Our staff will contact you if there is anything that needs to be discussed. Your next appointment is 02/05/2021 remote transmission from home. Left message with the next remote transmission date.

## 2021-02-05 ENCOUNTER — NURSE ONLY (OUTPATIENT)
Dept: NON INVASIVE DIAGNOSTICS | Age: 71
End: 2021-02-05
Payer: COMMERCIAL

## 2021-02-05 DIAGNOSIS — I63.9 ACUTE CVA (CEREBROVASCULAR ACCIDENT) (HCC): ICD-10-CM

## 2021-02-05 DIAGNOSIS — Z95.818 STATUS POST PLACEMENT OF IMPLANTABLE LOOP RECORDER: Primary | ICD-10-CM

## 2021-02-05 PROCEDURE — 93298 REM INTERROG DEV EVAL SCRMS: CPT | Performed by: INTERNAL MEDICINE

## 2021-02-05 PROCEDURE — G2066 INTER DEVC REMOTE 30D: HCPCS | Performed by: INTERNAL MEDICINE

## 2021-02-24 NOTE — PROGRESS NOTES
See [employer] [place] report. Remote monitoring reviewed over a 30 day period. End of 30 day monitoring period date of service 2.5.2021.

## 2021-02-26 ENCOUNTER — TELEPHONE (OUTPATIENT)
Dept: NON INVASIVE DIAGNOSTICS | Age: 71
End: 2021-02-26

## 2021-02-26 NOTE — TELEPHONE ENCOUNTER
----- Message from Meribeth Rinne, RN sent at 2/24/2021 10:45 AM EST -----  Successful transmission received. Please call patient and give next appointment.

## 2021-02-26 NOTE — TELEPHONE ENCOUNTER
We have received your remote transmission. Our staff will contact you if there is anything that needs to be discussed. Your next appointment is 03/08/2021 remote transmission from home. Left message with the next remote transmission date.

## 2021-03-08 ENCOUNTER — NURSE ONLY (OUTPATIENT)
Dept: NON INVASIVE DIAGNOSTICS | Age: 71
End: 2021-03-08
Payer: COMMERCIAL

## 2021-03-08 DIAGNOSIS — I63.9 ACUTE CVA (CEREBROVASCULAR ACCIDENT) (HCC): ICD-10-CM

## 2021-03-08 DIAGNOSIS — Z95.818 STATUS POST PLACEMENT OF IMPLANTABLE LOOP RECORDER: Primary | ICD-10-CM

## 2021-03-26 PROCEDURE — 93298 REM INTERROG DEV EVAL SCRMS: CPT | Performed by: INTERNAL MEDICINE

## 2021-03-26 PROCEDURE — G2066 INTER DEVC REMOTE 30D: HCPCS | Performed by: INTERNAL MEDICINE

## 2021-03-26 NOTE — PROGRESS NOTES
See [employer] [place] report. Remote monitoring reviewed over a 30 day period. End of 30 day monitoring period date of service 3.8.2021.

## 2021-04-01 ENCOUNTER — TELEPHONE (OUTPATIENT)
Dept: NON INVASIVE DIAGNOSTICS | Age: 71
End: 2021-04-01

## 2021-04-01 NOTE — TELEPHONE ENCOUNTER
----- Message from Trudy Salas RN sent at 3/26/2021 12:14 PM EDT -----  Successful transmission received. Please call patient and give next appointment.

## 2021-04-01 NOTE — TELEPHONE ENCOUNTER
We have received your remote transmission. Our staff will contact you if there is anything that needs to be discussed. Your next appointment is 04/08/2021 remote transmission from home. Left message with the next remote transmission date.

## 2021-04-08 ENCOUNTER — NURSE ONLY (OUTPATIENT)
Dept: NON INVASIVE DIAGNOSTICS | Age: 71
End: 2021-04-08

## 2021-04-08 DIAGNOSIS — Z95.818 STATUS POST PLACEMENT OF IMPLANTABLE LOOP RECORDER: Primary | ICD-10-CM

## 2021-04-08 DIAGNOSIS — I63.9 ACUTE CVA (CEREBROVASCULAR ACCIDENT) (HCC): ICD-10-CM

## 2021-04-13 PROCEDURE — 93298 REM INTERROG DEV EVAL SCRMS: CPT | Performed by: INTERNAL MEDICINE

## 2021-04-13 PROCEDURE — G2066 INTER DEVC REMOTE 30D: HCPCS | Performed by: INTERNAL MEDICINE

## 2021-04-15 ENCOUNTER — TELEPHONE (OUTPATIENT)
Dept: NON INVASIVE DIAGNOSTICS | Age: 71
End: 2021-04-15

## 2021-04-15 NOTE — TELEPHONE ENCOUNTER
We have received your remote transmission. Our staff will contact you if there is anything that needs to be discussed. Your next appointment is 05/10/2021 remote transmission from home. Left message with the next remote transmission date.

## 2021-04-15 NOTE — TELEPHONE ENCOUNTER
----- Message from Rosa Dickson RN sent at 4/13/2021  7:02 AM EDT -----  Successful transmission received. Please call patient and give next appointment.

## 2021-05-10 ENCOUNTER — NURSE ONLY (OUTPATIENT)
Dept: NON INVASIVE DIAGNOSTICS | Age: 71
End: 2021-05-10
Payer: COMMERCIAL

## 2021-05-10 DIAGNOSIS — I63.9 ACUTE CVA (CEREBROVASCULAR ACCIDENT) (HCC): ICD-10-CM

## 2021-05-10 DIAGNOSIS — Z95.818 STATUS POST PLACEMENT OF IMPLANTABLE LOOP RECORDER: Primary | ICD-10-CM

## 2021-06-04 PROCEDURE — G2066 INTER DEVC REMOTE 30D: HCPCS | Performed by: INTERNAL MEDICINE

## 2021-06-04 PROCEDURE — 93298 REM INTERROG DEV EVAL SCRMS: CPT | Performed by: INTERNAL MEDICINE

## 2021-06-04 NOTE — PROGRESS NOTES
See [employer] [place] report. Remote monitoring reviewed over a 30 day period. End of 30 day monitoring period date of service 5/10/2021.

## 2021-06-07 ENCOUNTER — TELEPHONE (OUTPATIENT)
Dept: NON INVASIVE DIAGNOSTICS | Age: 71
End: 2021-06-07

## 2021-06-07 NOTE — TELEPHONE ENCOUNTER
----- Message from Bettina Murillo RN sent at 6/4/2021  2:27 PM EDT -----  Successful transmission received. Please call patient and give next appointment.

## 2021-06-07 NOTE — TELEPHONE ENCOUNTER
We have received your remote transmission. Our staff will contact you if there is anything that needs to be discussed. Your next appointment is 06/10/2020 remote transmission from home. Left message with the next remote transmission date.

## 2021-06-10 ENCOUNTER — NURSE ONLY (OUTPATIENT)
Dept: NON INVASIVE DIAGNOSTICS | Age: 71
End: 2021-06-10

## 2021-06-10 DIAGNOSIS — Z95.818 STATUS POST PLACEMENT OF IMPLANTABLE LOOP RECORDER: Primary | ICD-10-CM

## 2021-06-10 DIAGNOSIS — I63.9 ACUTE CVA (CEREBROVASCULAR ACCIDENT) (HCC): ICD-10-CM

## 2021-07-09 NOTE — PROGRESS NOTES
See [employer] [place] report. Remote monitoring reviewed over a 30 day period. End of 30 day monitoring period date of service 6/10/21.     Erickson Cormier RN, BSN  [facility]

## 2021-07-19 ENCOUNTER — OFFICE VISIT (OUTPATIENT)
Dept: NEUROLOGY | Age: 71
End: 2021-07-19
Payer: COMMERCIAL

## 2021-07-19 VITALS
HEART RATE: 77 BPM | DIASTOLIC BLOOD PRESSURE: 93 MMHG | OXYGEN SATURATION: 96 % | WEIGHT: 205 LBS | TEMPERATURE: 97.3 F | BODY MASS INDEX: 26.31 KG/M2 | HEIGHT: 74 IN | SYSTOLIC BLOOD PRESSURE: 147 MMHG

## 2021-07-19 DIAGNOSIS — I63.9 CRYPTOGENIC STROKE (HCC): Primary | ICD-10-CM

## 2021-07-19 PROCEDURE — 99214 OFFICE O/P EST MOD 30 MIN: CPT | Performed by: NURSE PRACTITIONER

## 2021-07-19 RX ORDER — SEMAGLUTIDE 1.34 MG/ML
INJECTION, SOLUTION SUBCUTANEOUS
COMMUNITY
Start: 2021-06-15

## 2021-07-19 NOTE — PROGRESS NOTES
[facility]. Jossie Simmons M.D., F.A.C.P. Graciela Catalan, DNP, APRN, CNS  Nicoleearnest Noel. Nanette Grullon, MSN, APRN-FNP-C  Simon Prieto MSN, APRN, FNP-C  Jennifer ESTRELLA, PA-C  [address] MSN, APRN, FNP-C  [address], [address] [address]  Phone: [phone]  Fax: [phone]       Ana Gongora is a 66 y.o. right handed male although ambidextrous as well     Neurology is following for stroke    Patient presented to his appointment today with his wife. Both are great historians. Patient initially presented to [facility] ED on 7-7-2019 with complaint of sudden onset right arm and leg weakness and numbness. NIHSS was 6 in the ED. CT head was negative for hemorrhage and TPA was administered. MRI brain showed acute embolic strokes in multiple vascular distributions including left frontal parietal and right occipital.  CTA showed no large vessel occlusions or significant stenosis. MALIK showed no embolic source and he underwent LINQ insertion. He was discharged home on 7/11/19. Patient remains on aspirin and statin for secondary stroke prevention. Stroke risk factors include diabetes mellitus, hyperlipidemia, hypertension and former tobacco use. Since discharge patient continues to have residual right thumb and pointer finger numbness, weakness has improved significantly. She plans to have blood work with his PCP soon, last A1c 8.5 and . Patient has tingling which has been interrogated since discharge with no findings as of yet, next follow-up in January. Patient completed physical therapy in August 2019 of a total of 4-5 visits since discharge. He denies prior history of stroke or seizure. No reported history of cardiac issues. Denies heart palpitations, fluttering, skipped beats, shortness of breath or chest pain. Patient presents today with no new complaints or medical issues.   Patient's Linq has not been interrogated since June; report of malfunctioning transmission. Patient's last A1c 6.8 and  in March. Patient was started on Ozempic since that time. Patient denies any new or recurrent stroke symptoms. Patient has had no falls and has maintained safety during COVID-19. In addition to prior stroke, patient also has history of hypertension, hyperlipidemia, diabetes mellitus, abdominal aortic aneurysm status post repair and renal insufficiency all managed by patient's PCP and urology. Patient reports that he quit smoking in July after his stroke. He has a 10.00 pack-year smoking history. He has never used smokeless tobacco. He reports current alcohol, approximately 2 times a week socially. He reports that he does not use drugs. She reports he sleeps 4 to 5 hours per night with naps during the day. Patient complains of fatigue. Patient drinks 2 cups of coffee daily. Patient drinks diet soda approximately 1 time per week. Patient drinks 8 glasses of water daily. Patient denies any recent stress. Patient is  and lives with family. Patient is a retired . No chest pain or palpitations  No SOB  No vertigo, lightheadedness or loss of consciousness  No falls, tripping or stumbling  No incontinence of bowels or bladder  No itching or bruising appreciated  No numbness, tingling or focal arm/leg weakness    ROS is otherwise negative    Objective:     BP (!) 147/93 (Site: Right Upper Arm)   Pulse 77   Temp 97.3 °F (36.3 °C)   Ht 6' 2\" (1.88 m)   Wt 205 lb (93 kg)   SpO2 96%   BMI 26.32 kg/m²     General appearance: alert, appears stated age, cooperative and in no distress  Head: normocephalic, without obvious abnormality, atraumatic  Eyes: conjunctivae/corneas clear; no drainage  Neck: no carotid bruit, supple, symmetrical, trachea midline  Back: symmetric, no curvature.  ROM normal.   Lungs: clear to auscultation bilaterally, unlabored breaths  Heart: regular rate and rhythm  Abdomen: soft, non-tender; bowel sounds normal  Extremities: normal, atraumatic, no cyanosis or edema  Pulses: 2+ and symmetric  Skin:  color, texture, turgor normal--no rashes or lesions      Mental Status: alert and oriented x 4; pleasant, follows all commands well    Appropriate attention/concentration  Intact fundus of knowledge  Memories intact    Speech: no dysarthria  Language: no aphasias---reading, writing, repetition, and object identification intact    Cranial Nerves:  I: smell  intact   II: visual acuity     II: visual fields Full to confrontation   II: pupils PERRL   III,VII: ptosis None   III,IV,VI: extraocular muscles   EOMI with ? right gaze nystagmus noted   V: mastication Normal   V: facial light touch sensation  Normal   V,VII: corneal reflex     VII: facial muscle function - upper  Normal   VII: facial muscle function - lower Normal   VIII: hearing Normal   IX: soft palate elevation  Normal   IX,X: gag reflex    XI: trapezius strength  5/5   XI: sternocleidomastoid strength 5/5   XI: neck extension strength  5/5   XII: tongue strength  Normal     Motor:  5/5 throughout  Equal handgrips 5/5  Normal bulk and tone  No drift   No abnormal movements    Sensory:  LT and PP normal  Vibration decreased to right knee due to knee replacement    Coordination:   FN, FFM and AMY normal  HS normal    Gait:  Normal  Walks well on toes, heels, and tandem    DTR:   Right Brachioradialis reflex 2+  Left Brachioradialis reflex 2+  Right Biceps reflex 2+  Left Biceps reflex 2+  Right Triceps reflex 2+  Left Triceps reflex 2+  Right Quadriceps reflex 0  Left Quadriceps reflex 0  Right Achilles reflex 0  Left Achilles reflex 0    No Babinskis  No Rizvi's    No other pathological reflexes    Laboratory/Radiology:  ry/Radiology:     CBC:   Lab Results   Component Value Date    WBC 6.8 03/05/2021    RBC 4.70 03/05/2021    HGB 13.7 03/05/2021    HCT 43.6 03/05/2021    MCV 92.8 03/05/2021    MCH 29.1 03/05/2021 MCHC 31.4 03/05/2021    RDW 15.1 03/05/2021     03/05/2021    MPV 10.7 03/05/2021     Lab Results   Component Value Date     03/05/2021    K 4.5 03/05/2021     03/05/2021    CO2 24 03/05/2021    BUN 50 (H) 03/05/2021    CREATININE 2.6 (H) 03/05/2021    GLUCOSE 113 (H) 11/03/2020    CALCIUM 9.8 03/05/2021    PROT 7.2 03/05/2021    LABALBU 4.3 03/05/2021    BILITOT 0.2 03/05/2021    ALKPHOS 32 (L) 03/05/2021    AST 18 03/05/2021    ALT 15 03/05/2021    LABGLOM 24 03/05/2021    GFRAA 30 03/05/2021     HgBA1c:    Lab Results   Component Value Date    LABA1C 6.8 03/05/2021     FLP:    Lab Results   Component Value Date    TRIG 242 03/05/2021    HDL 30 03/05/2021    LDLCALC 130 03/05/2021    LABVLDL 48 03/05/2021     CT head without contrast 7-7-2019 no evidence of acute intracranial hemorrhage or edema. CTA head and neck with contrast 7-7-2019  1. No evidence of intracranial hemorrhage or edema.       2. No evidence of arterial stenosis at level of the [place].       3. No evidence of stenosis involving proximal or distal cervical   internal carotid arteries or vertebral arteries.       4. Significant noncalcified atherosclerotic plaque is seen involving   thoracic aortic arch and at origin in of great vessels. CT brain perfusion 7-7-2019  Findings suggest recent stroke involving posterior left frontal lobe. Findings called to Dr. Kahlil Heredia at 2:24 PM 7/7/2019. MRI brain without contrast 7-8-2019  Small acute infarcts. Largest is in left frontoparietal convexity and   a smaller one is in the right occipital lobe. Multiplicity and   bilaterality raises the possibility of cardiac embolic source. Clinical correlation is recommended     MALIK 7-  No source of embolus identified. Overall ejection fraction is normal .   Normal right ventricle structure and function. Physiologic and/or trace mitral regurgitation is present.    No evidence of thrombus within left atrium or appendage. Agitated saline injected for shunt evaluation. No evidence of patent foramen ovale. No evidence of atrial septal defect. All labs and images were personally reviewed at the time of this visit    Assessment:     Acute bilateral embolic-appearing infarcts suspicious for cardioembolic source in July 7468   Patient has had good recovery--with intermittent right thumb and pointer finger numbness only   So far Linq has not revealed any arrhythmias--patient follows up with EP regularly    Report of malfunctioning transmissions of Linq--- will message EP RNs for follow-up   Pt remains on aspirin and statin for secondary stroke prevention   Patient has completed PT/OT since discharge   Patient continues to work on his risk factors including hypertension, hyperlipidemia, diabetes; patient has quit smoking--- in March A1c 6.8,     Chronic sleep deprivation   Patient complains of fatigue   Patient reports 4 to 5 hours of sleep per night; recommended melatonin   Recommended eliminating naps during the day which he has improved since last visit    Renal insufficiency   Managed by patient's urologist    Vitamin D deficiency   Vitamin D supplements per PCP    Patient has been doing well with no new medical issues or recurrent stroke symptoms. Patient continues to work on stroke risk factors which were reiterated during visit with latest A1c and LDL. Exercise is encouraged as well as better diet.     Plan:     Recommend over-the-counter melatonin  Continue aspirin and statin  Follow-up with EP for Linq interrogations per their recommendations   Message sent for continuation of transmissions  Continue working on risk factors and lifestyle modification  Follow-up as needed  Call with any issues      CHELSEA Chandler NP, CHELSEA NP-C  2:23 PM  7/19/2021    I spent 35 minutes with this patient obtaining the HPI and discussing the exam with greater than 50% of the time providing counseling and education on medications and other treatment plans. All questions were answered prior to leaving my office.

## 2021-07-20 ENCOUNTER — NURSE ONLY (OUTPATIENT)
Dept: NON INVASIVE DIAGNOSTICS | Age: 71
End: 2021-07-20

## 2021-07-20 DIAGNOSIS — Z95.818 STATUS POST PLACEMENT OF IMPLANTABLE LOOP RECORDER: Primary | ICD-10-CM

## 2021-07-20 DIAGNOSIS — I63.9 ACUTE CVA (CEREBROVASCULAR ACCIDENT) (HCC): ICD-10-CM

## 2021-07-20 NOTE — PROGRESS NOTES
See [employer] [place] report. Remote monitoring reviewed over a 30 day period. End of 30 day monitoring period date of service 7/20/21.     Hari Hayward RN, BSN  [facility]

## 2021-08-18 ENCOUNTER — TELEPHONE (OUTPATIENT)
Dept: NON INVASIVE DIAGNOSTICS | Age: 71
End: 2021-08-18

## 2021-12-29 ENCOUNTER — TELEPHONE (OUTPATIENT)
Dept: NON INVASIVE DIAGNOSTICS | Age: 71
End: 2021-12-29

## 2021-12-29 NOTE — TELEPHONE ENCOUNTER
Left message on TAD asking patient to send a remote ILR transmission. I stated if he leaves the monitor plugged in all the time the monitor will automatically send the check when he is due.     Sharath Soria RN, BSN  [facility]

## 2022-09-28 ENCOUNTER — HOSPITAL ENCOUNTER (OUTPATIENT)
Dept: CT IMAGING | Age: 72
Discharge: HOME | End: 2022-09-28
Payer: MEDICARE

## 2022-09-28 ENCOUNTER — HOSPITAL ENCOUNTER (OUTPATIENT)
Age: 72
Discharge: HOME | End: 2022-09-28
Payer: MEDICARE

## 2022-09-28 DIAGNOSIS — I71.40 ABDOMINAL AORTIC ANEURYSM (AAA) WITHOUT RUPTURE: ICD-10-CM

## 2022-09-28 LAB
BUN BLDV-MCNC: 32 MG/DL (ref 6–23)
CREAT SERPL-MCNC: 2.7 MG/DL (ref 0.7–1.2)
GFR AFRICAN AMERICAN: 28
GFR NON-AFRICAN AMERICAN: 23 ML/MIN/1.73

## 2022-09-28 PROCEDURE — 82565 ASSAY OF CREATININE: CPT

## 2022-09-28 PROCEDURE — 36415 COLL VENOUS BLD VENIPUNCTURE: CPT

## 2022-09-28 PROCEDURE — 84520 ASSAY OF UREA NITROGEN: CPT

## 2022-09-28 PROCEDURE — 74176 CT ABD & PELVIS W/O CONTRAST: CPT

## 2024-05-16 NOTE — TELEPHONE ENCOUNTER
Patient is aware urine sample is needed. Urinal at bedside.    We have received your remote transmission. Our staff will contact you if there is anything that needs to be discussed. Your next appointment is 08/24/2020 remote transmission from home.     First attempt, Left message to call the office back [phone]